# Patient Record
Sex: FEMALE | Race: WHITE | NOT HISPANIC OR LATINO | URBAN - METROPOLITAN AREA
[De-identification: names, ages, dates, MRNs, and addresses within clinical notes are randomized per-mention and may not be internally consistent; named-entity substitution may affect disease eponyms.]

---

## 2019-11-20 ENCOUNTER — EMERGENCY (EMERGENCY)
Facility: HOSPITAL | Age: 84
LOS: 1 days | Discharge: HOME | End: 2019-11-20
Attending: EMERGENCY MEDICINE | Admitting: EMERGENCY MEDICINE
Payer: MEDICARE

## 2019-11-20 VITALS
TEMPERATURE: 98 F | RESPIRATION RATE: 18 BRPM | OXYGEN SATURATION: 100 % | SYSTOLIC BLOOD PRESSURE: 151 MMHG | HEART RATE: 60 BPM | DIASTOLIC BLOOD PRESSURE: 82 MMHG

## 2019-11-20 VITALS
OXYGEN SATURATION: 97 % | RESPIRATION RATE: 14 BRPM | SYSTOLIC BLOOD PRESSURE: 147 MMHG | DIASTOLIC BLOOD PRESSURE: 80 MMHG | WEIGHT: 119.93 LBS | TEMPERATURE: 97 F | HEART RATE: 60 BPM | HEIGHT: 63 IN

## 2019-11-20 LAB
ALBUMIN SERPL ELPH-MCNC: 3.9 G/DL — SIGNIFICANT CHANGE UP (ref 3.3–5)
ALP SERPL-CCNC: 89 U/L — SIGNIFICANT CHANGE UP (ref 40–120)
ALT FLD-CCNC: 19 U/L — SIGNIFICANT CHANGE UP (ref 12–78)
ANION GAP SERPL CALC-SCNC: 7 MMOL/L — SIGNIFICANT CHANGE UP (ref 5–17)
APPEARANCE UR: CLEAR — SIGNIFICANT CHANGE UP
APTT BLD: 34.2 SEC — SIGNIFICANT CHANGE UP (ref 28.5–37)
AST SERPL-CCNC: 23 U/L — SIGNIFICANT CHANGE UP (ref 15–37)
BASOPHILS # BLD AUTO: 0.05 K/UL — SIGNIFICANT CHANGE UP (ref 0–0.2)
BASOPHILS NFR BLD AUTO: 0.4 % — SIGNIFICANT CHANGE UP (ref 0–2)
BILIRUB SERPL-MCNC: 0.4 MG/DL — SIGNIFICANT CHANGE UP (ref 0.2–1.2)
BILIRUB UR-MCNC: NEGATIVE — SIGNIFICANT CHANGE UP
BUN SERPL-MCNC: 26 MG/DL — HIGH (ref 7–23)
CALCIUM SERPL-MCNC: 9.9 MG/DL — SIGNIFICANT CHANGE UP (ref 8.5–10.1)
CHLORIDE SERPL-SCNC: 107 MMOL/L — SIGNIFICANT CHANGE UP (ref 96–108)
CK MB BLD-MCNC: 0.9 % — SIGNIFICANT CHANGE UP (ref 0–3.5)
CK MB CFR SERPL CALC: 2.9 NG/ML — SIGNIFICANT CHANGE UP (ref 0–3.6)
CK SERPL-CCNC: 325 U/L — HIGH (ref 26–192)
CO2 SERPL-SCNC: 28 MMOL/L — SIGNIFICANT CHANGE UP (ref 22–31)
COLOR SPEC: SIGNIFICANT CHANGE UP
CREAT SERPL-MCNC: 0.75 MG/DL — SIGNIFICANT CHANGE UP (ref 0.5–1.3)
DIFF PNL FLD: NEGATIVE — SIGNIFICANT CHANGE UP
EOSINOPHIL # BLD AUTO: 0.09 K/UL — SIGNIFICANT CHANGE UP (ref 0–0.5)
EOSINOPHIL NFR BLD AUTO: 0.8 % — SIGNIFICANT CHANGE UP (ref 0–6)
GLUCOSE SERPL-MCNC: 98 MG/DL — SIGNIFICANT CHANGE UP (ref 70–99)
GLUCOSE UR QL: NEGATIVE — SIGNIFICANT CHANGE UP
HCT VFR BLD CALC: 43.8 % — SIGNIFICANT CHANGE UP (ref 34.5–45)
HGB BLD-MCNC: 14.5 G/DL — SIGNIFICANT CHANGE UP (ref 11.5–15.5)
IMM GRANULOCYTES NFR BLD AUTO: 0.3 % — SIGNIFICANT CHANGE UP (ref 0–1.5)
INR BLD: 1.03 RATIO — SIGNIFICANT CHANGE UP (ref 0.88–1.16)
KETONES UR-MCNC: NEGATIVE — SIGNIFICANT CHANGE UP
LACTATE SERPL-SCNC: 1.1 MMOL/L — SIGNIFICANT CHANGE UP (ref 0.7–2)
LEUKOCYTE ESTERASE UR-ACNC: NEGATIVE — SIGNIFICANT CHANGE UP
LYMPHOCYTES # BLD AUTO: 1.19 K/UL — SIGNIFICANT CHANGE UP (ref 1–3.3)
LYMPHOCYTES # BLD AUTO: 10.1 % — LOW (ref 13–44)
MCHC RBC-ENTMCNC: 29.7 PG — SIGNIFICANT CHANGE UP (ref 27–34)
MCHC RBC-ENTMCNC: 33.1 GM/DL — SIGNIFICANT CHANGE UP (ref 32–36)
MCV RBC AUTO: 89.8 FL — SIGNIFICANT CHANGE UP (ref 80–100)
MONOCYTES # BLD AUTO: 0.68 K/UL — SIGNIFICANT CHANGE UP (ref 0–0.9)
MONOCYTES NFR BLD AUTO: 5.7 % — SIGNIFICANT CHANGE UP (ref 2–14)
NEUTROPHILS # BLD AUTO: 9.79 K/UL — HIGH (ref 1.8–7.4)
NEUTROPHILS NFR BLD AUTO: 82.7 % — HIGH (ref 43–77)
NITRITE UR-MCNC: NEGATIVE — SIGNIFICANT CHANGE UP
NRBC # BLD: 0 /100 WBCS — SIGNIFICANT CHANGE UP (ref 0–0)
NT-PROBNP SERPL-SCNC: 135 PG/ML — SIGNIFICANT CHANGE UP (ref 0–450)
PH UR: 8 — SIGNIFICANT CHANGE UP (ref 5–8)
PLATELET # BLD AUTO: 225 K/UL — SIGNIFICANT CHANGE UP (ref 150–400)
POTASSIUM SERPL-MCNC: 3.4 MMOL/L — LOW (ref 3.5–5.3)
POTASSIUM SERPL-SCNC: 3.4 MMOL/L — LOW (ref 3.5–5.3)
PROT SERPL-MCNC: 7.3 G/DL — SIGNIFICANT CHANGE UP (ref 6–8.3)
PROT UR-MCNC: NEGATIVE — SIGNIFICANT CHANGE UP
PROTHROM AB SERPL-ACNC: 11.7 SEC — SIGNIFICANT CHANGE UP (ref 10–12.9)
RBC # BLD: 4.88 M/UL — SIGNIFICANT CHANGE UP (ref 3.8–5.2)
RBC # FLD: 13.4 % — SIGNIFICANT CHANGE UP (ref 10.3–14.5)
SODIUM SERPL-SCNC: 142 MMOL/L — SIGNIFICANT CHANGE UP (ref 135–145)
SP GR SPEC: 1.01 — SIGNIFICANT CHANGE UP (ref 1.01–1.02)
TROPONIN I SERPL-MCNC: <.015 NG/ML — SIGNIFICANT CHANGE UP (ref 0.01–0.04)
UROBILINOGEN FLD QL: NEGATIVE — SIGNIFICANT CHANGE UP
WBC # BLD: 11.84 K/UL — HIGH (ref 3.8–10.5)
WBC # FLD AUTO: 11.84 K/UL — HIGH (ref 3.8–10.5)

## 2019-11-20 PROCEDURE — 85730 THROMBOPLASTIN TIME PARTIAL: CPT

## 2019-11-20 PROCEDURE — 83880 ASSAY OF NATRIURETIC PEPTIDE: CPT

## 2019-11-20 PROCEDURE — 71045 X-RAY EXAM CHEST 1 VIEW: CPT | Mod: 26

## 2019-11-20 PROCEDURE — 82553 CREATINE MB FRACTION: CPT

## 2019-11-20 PROCEDURE — 82550 ASSAY OF CK (CPK): CPT

## 2019-11-20 PROCEDURE — 93005 ELECTROCARDIOGRAM TRACING: CPT

## 2019-11-20 PROCEDURE — 70450 CT HEAD/BRAIN W/O DYE: CPT

## 2019-11-20 PROCEDURE — 85610 PROTHROMBIN TIME: CPT

## 2019-11-20 PROCEDURE — 72100 X-RAY EXAM L-S SPINE 2/3 VWS: CPT

## 2019-11-20 PROCEDURE — 71045 X-RAY EXAM CHEST 1 VIEW: CPT

## 2019-11-20 PROCEDURE — 81003 URINALYSIS AUTO W/O SCOPE: CPT

## 2019-11-20 PROCEDURE — 99284 EMERGENCY DEPT VISIT MOD MDM: CPT | Mod: 25

## 2019-11-20 PROCEDURE — 72170 X-RAY EXAM OF PELVIS: CPT

## 2019-11-20 PROCEDURE — 99285 EMERGENCY DEPT VISIT HI MDM: CPT

## 2019-11-20 PROCEDURE — 72125 CT NECK SPINE W/O DYE: CPT | Mod: 26

## 2019-11-20 PROCEDURE — 70450 CT HEAD/BRAIN W/O DYE: CPT | Mod: 26

## 2019-11-20 PROCEDURE — 72170 X-RAY EXAM OF PELVIS: CPT | Mod: 26

## 2019-11-20 PROCEDURE — 36415 COLL VENOUS BLD VENIPUNCTURE: CPT

## 2019-11-20 PROCEDURE — 84484 ASSAY OF TROPONIN QUANT: CPT

## 2019-11-20 PROCEDURE — 83605 ASSAY OF LACTIC ACID: CPT

## 2019-11-20 PROCEDURE — 72100 X-RAY EXAM L-S SPINE 2/3 VWS: CPT | Mod: 26

## 2019-11-20 PROCEDURE — 93010 ELECTROCARDIOGRAM REPORT: CPT

## 2019-11-20 PROCEDURE — 85027 COMPLETE CBC AUTOMATED: CPT

## 2019-11-20 PROCEDURE — 80053 COMPREHEN METABOLIC PANEL: CPT

## 2019-11-20 PROCEDURE — 97162 PT EVAL MOD COMPLEX 30 MIN: CPT

## 2019-11-20 PROCEDURE — 72125 CT NECK SPINE W/O DYE: CPT

## 2019-11-20 RX ORDER — ACETAMINOPHEN 500 MG
650 TABLET ORAL ONCE
Refills: 0 | Status: COMPLETED | OUTPATIENT
Start: 2019-11-20 | End: 2019-11-20

## 2019-11-20 RX ADMIN — Medication 650 MILLIGRAM(S): at 12:40

## 2019-11-20 RX ADMIN — Medication 650 MILLIGRAM(S): at 11:40

## 2019-11-20 NOTE — ED ADULT NURSE NOTE - OBJECTIVE STATEMENT
86 y/o female comes in A&Ox4 from home via EMS with complaints of fall out of bed. States she got up to go to the bathroom and when she came back to the bedroom she fell. Denies LOC or hitting her head. States she was unable to get up and is complaining of generalized pain. EKG obtained. PIV started. Plan of care discussed with patient. Comfort and safety maintained. Will continue to monitor.

## 2019-11-20 NOTE — ED PROVIDER NOTE - PHYSICAL EXAMINATION
GEN: awake, alert, well appearing, NAD   HENT: atraumatic, normocephalic, SHADY, EOMI, no midline instability, oropharynx w/o erythema or exudates, no lymphadenopathy  CV: normal rate and rhythm, S1, S2, no MRG, equal pulses throughout, no JVD  RESP: no distress, no IWOB, no retraction, clear to auscultation bilaterally   ABD: soft, nontender, nondistended, no rebound, no guarding, normoactive bowel sounds, no organomegally  MUSCULOSKELETAL: strenght 5/5 x 4, full range of motion, CMS intact   SKIN: normal color, no turgor, no wounds or rash   NEURO: Awake alert oriented x 3, no facial asymmetry, no slurred speech, no pronator drift, moving all extremities  PSYCH: no suicial ideation, no homicidal ideation, no depression, no anxiety, no hallucination

## 2019-11-20 NOTE — ED PROVIDER NOTE - CARE PLAN
Assessment and plan of treatment:	88yo F h/o dementia, htn p/w fall last night unable to get up, c/o diffuse body aches. unknown head injury, not on AC. Principal Discharge DX:	Fall, initial encounter  Assessment and plan of treatment:	88yo F h/o dementia, htn p/w fall last night unable to get up, c/o diffuse body aches. unknown head injury, not on AC.  Secondary Diagnosis:	Unsteady gait

## 2019-11-20 NOTE — PHYSICAL THERAPY INITIAL EVALUATION ADULT - PERTINENT HX OF CURRENT PROBLEM, REHAB EVAL
Pt fell at home this morning and ws unable to stand up or walk. CT of head and neck  and x-rays of hip and spine were (-) for acute pathology. Pt's history is significant for dementia

## 2019-11-20 NOTE — ED PROVIDER NOTE - PLAN OF CARE
86yo F h/o dementia, htn p/w fall last night unable to get up, c/o diffuse body aches. unknown head injury, not on AC.

## 2019-11-20 NOTE — ED PROVIDER NOTE - CLINICAL SUMMARY MEDICAL DECISION MAKING FREE TEXT BOX
86yo F h/o dementia, htn p/w fall last night unable to get up, c/o diffuse body aches. unknown head injury, not on AC. 86yo F h/o dementia, htn p/w fall last night unable to get up, c/o diffuse body aches. unknown head injury, not on AC.  labs ct xr unremarkable,   pt seen by PT, rec'd rehab. pt and  declines rehab, declines to speak to SW for home PT/aid, requesting to be discharged, will follow up with pcp and will have pcp arrange for services/rehab if they feel they need it. discussed that I feel this is unsafe discharge however patient and  adamant in their refusal in rehab/home services, both patient and  competent to make medical decisions, understands risk of discharge.

## 2019-11-20 NOTE — PHYSICAL THERAPY INITIAL EVALUATION ADULT - ADDITIONAL COMMENTS
Pt owns a rollator which she uses . Pt has no steps to enter home and 5 steps to bedroom with 2 handrails.

## 2019-11-20 NOTE — ED PROVIDER NOTE - PATIENT PORTAL LINK FT
You can access the FollowMyHealth Patient Portal offered by [facility] by registering at the following website: [email or web address]. By joining [employer]’s FollowMyHealth portal, you will also be able to view your health information using other applications (apps) compatible with our system.

## 2019-11-20 NOTE — ED PROVIDER NOTE - OBJECTIVE STATEMENT
88yo F h/o dementia, htn p/w fall last night unable to get up, c/o diffuse body aches. unknown head injury, not on AC.

## 2019-11-20 NOTE — ED ADULT NURSE NOTE - CHPI ED NUR SYMPTOMS NEG
no numbness/no tingling/no fever/no loss of consciousness/no confusion/no bleeding/no abrasion/no vomiting/no deformity

## 2020-10-01 ENCOUNTER — EMERGENCY (EMERGENCY)
Facility: HOSPITAL | Age: 85
LOS: 1 days | Discharge: HOME | End: 2020-10-01
Attending: EMERGENCY MEDICINE | Admitting: EMERGENCY MEDICINE
Payer: MEDICARE

## 2020-10-01 VITALS
TEMPERATURE: 98 F | WEIGHT: 134.92 LBS | OXYGEN SATURATION: 100 % | HEART RATE: 76 BPM | RESPIRATION RATE: 18 BRPM | SYSTOLIC BLOOD PRESSURE: 110 MMHG | HEIGHT: 63 IN | DIASTOLIC BLOOD PRESSURE: 57 MMHG

## 2020-10-01 PROCEDURE — 99283 EMERGENCY DEPT VISIT LOW MDM: CPT

## 2020-10-01 PROCEDURE — 73060 X-RAY EXAM OF HUMERUS: CPT | Mod: 26,LT

## 2020-10-01 PROCEDURE — 72170 X-RAY EXAM OF PELVIS: CPT | Mod: 26

## 2020-10-01 PROCEDURE — 73590 X-RAY EXAM OF LOWER LEG: CPT | Mod: 26,LT

## 2020-10-01 RX ORDER — TETANUS TOXOID, REDUCED DIPHTHERIA TOXOID AND ACELLULAR PERTUSSIS VACCINE, ADSORBED 5; 2.5; 8; 8; 2.5 [IU]/.5ML; [IU]/.5ML; UG/.5ML; UG/.5ML; UG/.5ML
0.5 SUSPENSION INTRAMUSCULAR ONCE
Refills: 0 | Status: COMPLETED | OUTPATIENT
Start: 2020-10-01 | End: 2020-10-01

## 2020-10-01 RX ADMIN — TETANUS TOXOID, REDUCED DIPHTHERIA TOXOID AND ACELLULAR PERTUSSIS VACCINE, ADSORBED 0.5 MILLILITER(S): 5; 2.5; 8; 8; 2.5 SUSPENSION INTRAMUSCULAR at 22:43

## 2020-10-01 NOTE — ED ADULT NURSE NOTE - OBJECTIVE STATEMENT
Pt Presents to ED s/p fall. Pt states she was in the kitchen and slipped hitting her head on the tile floor. Pt denies taking blood thinners. Pt has neck pain. wound on nose. Pt is able to move x4 extremities. no deformities noted.

## 2020-10-01 NOTE — ED PROVIDER NOTE - NSFOLLOWUPINSTRUCTIONS_ED_ALL_ED_FT
1) Follow-up with your Primary Medical Doctor and Dr. Joseph. Call today / next business day for prompt follow-up.  2) Return to Emergency room for any worsening or persistent pain, weakness, fever, or any other concerning symptoms.  3) See attached instruction sheets for additional information, including information regarding signs and symptoms to look out for, reasons to seek immediate care and other important instructions.  4) Augmentin 875mg twice a day for 10 days.

## 2020-10-01 NOTE — ED PROVIDER NOTE - OBJECTIVE STATEMENT
87 yo female hx of dementia, htn, hypothyroid s/p mechanical slip and fall at home, fell onto her face/nose, no LOC, no neck pain c/o nasal pain, left upper arm pain and left lower leg pain BIBEMS for evaluation. Mild, nonradiating, no medications taken.  Denies any anticoagulant use. tdap not up to date

## 2020-10-01 NOTE — ED ADULT NURSE NOTE - NSIMPLEMENTINTERV_GEN_ALL_ED
Implemented All Fall with Harm Risk Interventions:  [place] to call system. Call bell, personal items and telephone within reach. Instruct patient to call for assistance. Room bathroom lighting operational. Non-slip footwear when patient is off stretcher. Physically safe environment: no spills, clutter or unnecessary equipment. Stretcher in lowest position, wheels locked, appropriate side rails in place. Provide visual cue, wrist band, yellow gown, etc. Monitor gait and stability. Monitor for mental status changes and reorient to person, place, and time. Review medications for side effects contributing to fall risk. Reinforce activity limits and safety measures with patient and family. Provide visual clues: red socks.

## 2020-10-01 NOTE — ED PROVIDER NOTE - NS ED ROS FT
Constitutional: - Fever, - Chills, - Anorexia, - Fatigue, - Night sweats  Eyes: - Discharge, - Irritation, - Redness, - Visual changes, - Light sensitivity, - Pain  EARS: - Ear Pain, - Tinnitus, - Decreased hearing  NOSE: - Congestion, - Bloody nose  MOUTH/THROAT: - Vocal Changes, - Drooling, - Sore throat  NECK: - Lumps, - Stiffness, - Pain  CV: - Palpitations, - Chest Pain, - Edema, - Syncope  RESP:  - Cough, - Shortness of Breath, - Dyspnea on Exertion, - Trouble speaking, - Pleuritic pain - Wheezing  GI: - Diarrhea, - Constipation, - Bloody stools, - Nausea, - Vomiting, - Abdominal Pain  : - Dysuria, -Frequency, - Hematuria, - Hesitancy, - Incontinence, - Saddle Anesthesia, - Abnormal discharge  MSK: - Myalgias, - Arthralgias, - Weakness, - Deformities, +arm pain  SKIN: - Color change, - Rash, - Swelling, - Ecchymosis, - Abrasion, +laceration  NEURO: - Change in behavior, - Dec. Alertness, - Headache, - Dizziness, - Change in speech, - Weakness, - Seizure-like activity, - Difficulty ambulating

## 2020-10-01 NOTE — ED PROVIDER NOTE - PHYSICAL EXAMINATION
Gen: Alert, NAD  Head/eyes: NC/AT, PERRL, EOM  ENT: airway patent, +dried blood b/l nares, +u-shaped nasal bridge laceration 1.5cm  Neck: supple, no tenderness/meningismus/JVD, Trachea midline  Pulm/lung: Bilateral clear BS, normal resp effort, no wheeze/stridor/retractions  CV/heart: RRR, no M/R/G, +2 dist pulses (radial, pedal DP/PT, popliteal)  GI/Abd: soft, NT/ND, +BS, no guarding/rebound tenderness  Musculoskeletal: no edema/erythema/cyanosis, FROM in all extremities, no C/T/L spine ttp, +ttp left upper arm, left distal tib/fib +ttp  Skin: no rash, no vesicles, no petechaie, no ecchymosis, no swelling  Neuro: AAOx3, CN 2-12 intact, normal sensation, 5/5 motor strength in all extremities Gen: Alert, NAD  Head/eyes: NC/AT, PERRL, EOM  ENT: airway patent, +dried blood b/l nares, +u-shaped nasal bridge laceration 1.5cm, no septal hematoma  Neck: supple, no tenderness/meningismus/JVD, Trachea midline  Pulm/lung: Bilateral clear BS, normal resp effort, no wheeze/stridor/retractions  CV/heart: RRR, no M/R/G, +2 dist pulses (radial, pedal DP/PT, popliteal)  GI/Abd: soft, NT/ND, +BS, no guarding/rebound tenderness  Musculoskeletal: no edema/erythema/cyanosis, FROM in all extremities, no C/T/L spine ttp, +ttp left upper arm, left distal tib/fib +ttp  Skin: no rash, no vesicles, no petechaie, no ecchymosis, no swelling  Neuro: AAOx3, CN 2-12 intact, normal sensation, 5/5 motor strength in all extremities

## 2020-10-01 NOTE — ED ADULT TRIAGE NOTE - CHIEF COMPLAINT QUOTE
BIBEMS from home S/P fall, slip while getting out of chair, unknown LOC, hit her face on the floor. C/O pain to the nasal bone, +swelling to the left forearm.

## 2020-10-01 NOTE — ED PROVIDER NOTE - PATIENT PORTAL LINK FT
You can access the FollowMyHealth Patient Portal offered by [facility] by registering at the following website: [email or web address]. By joining [employer]’s FollowMyHealth portal, you will also be able to view your health information using other applications (apps) compatible with our system.

## 2020-10-01 NOTE — ED PROVIDER NOTE - CARE PROVIDER_API CALL
Yoni Joseph  [facility]  [address] [place] [address]  Phone: [phone]  Fax: [phone]  Follow Up Time:

## 2020-10-01 NOTE — ED PROVIDER NOTE - CARE PLAN
Principal Discharge DX:	Open fracture of nasal bone, initial encounter  Secondary Diagnosis:	Nasal laceration, initial encounter  Secondary Diagnosis:	Fall, initial encounter

## 2020-10-02 VITALS
HEART RATE: 61 BPM | OXYGEN SATURATION: 95 % | SYSTOLIC BLOOD PRESSURE: 149 MMHG | RESPIRATION RATE: 18 BRPM | DIASTOLIC BLOOD PRESSURE: 74 MMHG | TEMPERATURE: 98 F

## 2020-10-02 PROBLEM — F03.90 UNSPECIFIED DEMENTIA WITHOUT BEHAVIORAL DISTURBANCE: Chronic | Status: ACTIVE | Noted: 2019-11-20

## 2020-10-02 PROBLEM — E03.9 HYPOTHYROIDISM, UNSPECIFIED: Chronic | Status: ACTIVE | Noted: 2019-11-20

## 2020-10-02 PROBLEM — I10 ESSENTIAL (PRIMARY) HYPERTENSION: Chronic | Status: ACTIVE | Noted: 2019-11-20

## 2020-10-02 PROCEDURE — 70450 CT HEAD/BRAIN W/O DYE: CPT

## 2020-10-02 PROCEDURE — 70486 CT MAXILLOFACIAL W/O DYE: CPT | Mod: 26

## 2020-10-02 PROCEDURE — 12011 RPR F/E/E/N/L/M 2.5 CM/<: CPT

## 2020-10-02 PROCEDURE — 73590 X-RAY EXAM OF LOWER LEG: CPT

## 2020-10-02 PROCEDURE — 70486 CT MAXILLOFACIAL W/O DYE: CPT

## 2020-10-02 PROCEDURE — 90471 IMMUNIZATION ADMIN: CPT

## 2020-10-02 PROCEDURE — 72170 X-RAY EXAM OF PELVIS: CPT

## 2020-10-02 PROCEDURE — 73060 X-RAY EXAM OF HUMERUS: CPT

## 2020-10-02 PROCEDURE — 70450 CT HEAD/BRAIN W/O DYE: CPT | Mod: 26

## 2020-10-02 PROCEDURE — 99284 EMERGENCY DEPT VISIT MOD MDM: CPT | Mod: 25

## 2020-10-02 PROCEDURE — 90715 TDAP VACCINE 7 YRS/> IM: CPT

## 2020-10-02 RX ADMIN — Medication 1 TABLET(S): at 02:18

## 2021-06-12 ENCOUNTER — INPATIENT (INPATIENT)
Facility: HOSPITAL | Age: 86
LOS: 5 days | Discharge: NURSING, REHAB OR LONG-TERM CARE | DRG: 884 | End: 2021-06-18
Attending: FAMILY MEDICINE | Admitting: FAMILY MEDICINE
Payer: MEDICARE

## 2021-06-12 VITALS
HEIGHT: 63 IN | TEMPERATURE: 96 F | RESPIRATION RATE: 18 BRPM | DIASTOLIC BLOOD PRESSURE: 74 MMHG | OXYGEN SATURATION: 94 % | SYSTOLIC BLOOD PRESSURE: 129 MMHG | WEIGHT: 115.08 LBS | HEART RATE: 65 BPM

## 2021-06-12 DIAGNOSIS — E03.9 HYPOTHYROIDISM, UNSPECIFIED: ICD-10-CM

## 2021-06-12 DIAGNOSIS — E78.5 HYPERLIPIDEMIA, UNSPECIFIED: ICD-10-CM

## 2021-06-12 DIAGNOSIS — Z98.890 OTHER SPECIFIED POSTPROCEDURAL STATES: Chronic | ICD-10-CM

## 2021-06-12 DIAGNOSIS — I10 ESSENTIAL (PRIMARY) HYPERTENSION: ICD-10-CM

## 2021-06-12 DIAGNOSIS — Z90.710 ACQUIRED ABSENCE OF BOTH CERVIX AND UTERUS: Chronic | ICD-10-CM

## 2021-06-12 DIAGNOSIS — K21.9 GASTRO-ESOPHAGEAL REFLUX DISEASE WITHOUT ESOPHAGITIS: ICD-10-CM

## 2021-06-12 DIAGNOSIS — F03.90 UNSPECIFIED DEMENTIA, UNSPECIFIED SEVERITY, WITHOUT BEHAVIORAL DISTURBANCE, PSYCHOTIC DISTURBANCE, MOOD DISTURBANCE, AND ANXIETY: ICD-10-CM

## 2021-06-12 DIAGNOSIS — R13.10 DYSPHAGIA, UNSPECIFIED: ICD-10-CM

## 2021-06-12 DIAGNOSIS — F03.91 UNSPECIFIED DEMENTIA WITH BEHAVIORAL DISTURBANCE: ICD-10-CM

## 2021-06-12 LAB
ALBUMIN SERPL ELPH-MCNC: 3.3 G/DL — SIGNIFICANT CHANGE UP (ref 3.3–5)
ALP SERPL-CCNC: 86 U/L — SIGNIFICANT CHANGE UP (ref 40–120)
ALT FLD-CCNC: 14 U/L — SIGNIFICANT CHANGE UP (ref 12–78)
ANION GAP SERPL CALC-SCNC: 5 MMOL/L — SIGNIFICANT CHANGE UP (ref 5–17)
AST SERPL-CCNC: 22 U/L — SIGNIFICANT CHANGE UP (ref 15–37)
BILIRUB SERPL-MCNC: 0.4 MG/DL — SIGNIFICANT CHANGE UP (ref 0.2–1.2)
BUN SERPL-MCNC: 21 MG/DL — SIGNIFICANT CHANGE UP (ref 7–23)
CALCIUM SERPL-MCNC: 9.6 MG/DL — SIGNIFICANT CHANGE UP (ref 8.5–10.1)
CHLORIDE SERPL-SCNC: 107 MMOL/L — SIGNIFICANT CHANGE UP (ref 96–108)
CO2 SERPL-SCNC: 31 MMOL/L — SIGNIFICANT CHANGE UP (ref 22–31)
CREAT SERPL-MCNC: 0.82 MG/DL — SIGNIFICANT CHANGE UP (ref 0.5–1.3)
GLUCOSE SERPL-MCNC: 95 MG/DL — SIGNIFICANT CHANGE UP (ref 70–99)
HCT VFR BLD CALC: 42.9 % — SIGNIFICANT CHANGE UP (ref 34.5–45)
HGB BLD-MCNC: 13.8 G/DL — SIGNIFICANT CHANGE UP (ref 11.5–15.5)
MAGNESIUM SERPL-MCNC: 2 MG/DL — SIGNIFICANT CHANGE UP (ref 1.6–2.6)
MCHC RBC-ENTMCNC: 28 PG — SIGNIFICANT CHANGE UP (ref 27–34)
MCHC RBC-ENTMCNC: 32.2 GM/DL — SIGNIFICANT CHANGE UP (ref 32–36)
MCV RBC AUTO: 87 FL — SIGNIFICANT CHANGE UP (ref 80–100)
NRBC # BLD: 0 /100 WBCS — SIGNIFICANT CHANGE UP (ref 0–0)
PLATELET # BLD AUTO: 236 K/UL — SIGNIFICANT CHANGE UP (ref 150–400)
POTASSIUM SERPL-MCNC: 3.4 MMOL/L — LOW (ref 3.5–5.3)
POTASSIUM SERPL-SCNC: 3.4 MMOL/L — LOW (ref 3.5–5.3)
PROT SERPL-MCNC: 7 G/DL — SIGNIFICANT CHANGE UP (ref 6–8.3)
RBC # BLD: 4.93 M/UL — SIGNIFICANT CHANGE UP (ref 3.8–5.2)
RBC # FLD: 13.9 % — SIGNIFICANT CHANGE UP (ref 10.3–14.5)
SARS-COV-2 RNA SPEC QL NAA+PROBE: SIGNIFICANT CHANGE UP
SODIUM SERPL-SCNC: 143 MMOL/L — SIGNIFICANT CHANGE UP (ref 135–145)
T4 AB SER-ACNC: 8.9 UG/DL — SIGNIFICANT CHANGE UP (ref 4.6–12)
T4/T3 UPTAKE INDEX SERPL: 0.9 TBI — SIGNIFICANT CHANGE UP (ref 0.8–1.3)
TSH SERPL-MCNC: 0.03 UIU/ML — LOW (ref 0.36–3.74)
WBC # BLD: 5.85 K/UL — SIGNIFICANT CHANGE UP (ref 3.8–10.5)
WBC # FLD AUTO: 5.85 K/UL — SIGNIFICANT CHANGE UP (ref 3.8–10.5)

## 2021-06-12 PROCEDURE — 93010 ELECTROCARDIOGRAM REPORT: CPT

## 2021-06-12 PROCEDURE — 99285 EMERGENCY DEPT VISIT HI MDM: CPT

## 2021-06-12 PROCEDURE — 99223 1ST HOSP IP/OBS HIGH 75: CPT | Mod: GC,AI

## 2021-06-12 PROCEDURE — 70450 CT HEAD/BRAIN W/O DYE: CPT | Mod: 26,MA

## 2021-06-12 RX ORDER — HYDROCHLOROTHIAZIDE 25 MG
25 TABLET ORAL DAILY
Refills: 0 | Status: DISCONTINUED | OUTPATIENT
Start: 2021-06-12 | End: 2021-06-18

## 2021-06-12 RX ORDER — POTASSIUM GLUCONATE 2.5 MEQ
1 TABLET ORAL
Qty: 0 | Refills: 0 | DISCHARGE

## 2021-06-12 RX ORDER — AMLODIPINE BESYLATE 2.5 MG/1
1 TABLET ORAL
Qty: 0 | Refills: 0 | DISCHARGE

## 2021-06-12 RX ORDER — AMLODIPINE BESYLATE 2.5 MG/1
2.5 TABLET ORAL DAILY
Refills: 0 | Status: DISCONTINUED | OUTPATIENT
Start: 2021-06-12 | End: 2021-06-18

## 2021-06-12 RX ADMIN — Medication 40 MILLIGRAM(S): at 22:35

## 2021-06-12 NOTE — H&P ADULT - NSHPSOURCEINFOTX_GEN_ALL_CORE
Moderna 2/2, last dose 4/15/21 Moderna 2/2, last dose 4/15/21;   is HCP with daughter Rosa Elena [phone]

## 2021-06-12 NOTE — ED PROVIDER NOTE - CONSTITUTIONAL, MLM
normal... Well appearing, elderly but confused white female, awake, alert, oriented to person, in no apparent distress.

## 2021-06-12 NOTE — ED ADULT NURSE REASSESSMENT NOTE - SYMPTOMS
----- Message from Angelika Light sent at 4/20/2018 11:12 AM CDT -----  Contact: Patient [phone]  Patient needs her orders for her MRI on her ankle to be faxed over to [facility] [phone]. Patient is waiting at the imaging office.     Please call and advise.    Thank You   Alert and oriented to self

## 2021-06-12 NOTE — H&P ADULT - NSICDXPASTSURGICALHX_GEN_ALL_CORE_FT
PAST SURGICAL HISTORY:  No significant past surgical history      PAST SURGICAL HISTORY:  H/O inguinal hernia repair BILATERAL    S/P hysterectomy

## 2021-06-12 NOTE — H&P ADULT - NSHPPHYSICALEXAM_GEN_ALL_CORE
GENERAL: patient appears well, no acute distress, confused  EYES: anicteric sclerae, no exudates  ENMT: oropharynx clear without erythema, no exudates, moist mucous membranes  NECK: supple, soft, no thyromegaly noted  LUNGS: clear to auscultation, no intercostal retractions  HEART: S1/S2, regular rate and rhythm, no murmurs noted, no lower extremity edema  GASTROINTESTINAL: abdomen is soft, nontender, nondistended, normoactive bowel sounds, no palpable masses  INTEGUMENT: good skin turgor, warm skin, appears well perfused  MUSCULOSKELETAL: no clubbing or cyanosis, no obvious deformity  NEUROLOGIC: awake, alert, oriented x1, good muscle tone in 4 extremities, no obvious sensory deficits. Confused, mild apraxia, aphasia   PSYCHIATRIC: mood is good, affect is congruent, linear and logical thought process  HEME/LYMPH: no palpable supraclavicular nodules, no obvious ecchymosis or petechiae GENERAL: no acute distress, confused, elderly, frail, malnourished  EYES: anicteric sclerae, no exudates  ENMT: oropharynx clear without erythema, no exudates, moist mucous membranes  NECK: supple, soft, no thyromegaly noted  LUNGS: clear to auscultation, no intercostal retractions  HEART: S1/S2, regular rate and rhythm, no murmurs noted, no lower extremity edema  GASTROINTESTINAL: abdomen is soft, nontender, nondistended, normoactive bowel sounds, no palpable masses  INTEGUMENT: good skin turgor, warm skin, appears well perfused  MUSCULOSKELETAL: no clubbing or cyanosis, no obvious deformity  NEUROLOGIC: awake, alert, oriented x1, good muscle tone in 4 extremities RE > LE, no obvious sensory deficits. Confused, mild apraxia, aphasia   PSYCHIATRIC: mood is good, affect is congruent, linear and logical thought process  HEME/LYMPH: no palpable supraclavicular nodules, no obvious ecchymosis or petechiae

## 2021-06-12 NOTE — H&P ADULT - PROBLEM SELECTOR PLAN 1
No official dx given by PMD however patient showing stereotypical signs for >2years. Patient deconditioned, not able to perform any ADLS. Family unable to care for pt at home.   - CT head: Status post right frontal temporal craniotomy for surgical clipping in the right parasellar region. Metallic artifact limits evaluation of the surrounding parenchyma. Encephalomalacia on the right frontal lobe underlying the craniotomy is reidentified. Findings are unchanged since prior exam. The ventricles and sulci are prominent, compatible with age-related generalized cerebral volume loss. There is no CT evidence for acute cerebral cortical infarct. There is no evidence of hemorrhage. Small chronic lacunar infarcts in the left basal ganglia are present. There is periventricular and subcortical white matter hypoattenuation,  most compatible with chronic microvascular ischemic changes.  No mass effect is found in the brain. There is no midline shift or herniation pattern.  - Continue home buspar 5mg qd   - Neurology consulted -   - PT & SW consulted  - palliative Dr Garcia consulted for [facility] No official dx given by PMD however patient showing stereotypical signs for >2years. Patient deconditioned, not able to perform any ADLS. Family unable to care for pt at home.   - CT head: Status post right frontal temporal craniotomy for surgical clipping in the right parasellar region. Metallic artifact limits evaluation of the surrounding parenchyma. Encephalomalacia on the right frontal lobe underlying the craniotomy is reidentified. Findings are unchanged since prior exam. The ventricles and sulci are prominent, compatible with age-related generalized cerebral volume loss. There is no CT evidence for acute cerebral cortical infarct. There is no evidence of hemorrhage. Small chronic lacunar infarcts in the left basal ganglia are present. There is periventricular and subcortical white matter hypoattenuation,  most compatible with chronic microvascular ischemic changes.  No mass effect is found in the brain. There is no midline shift or herniation pattern.  - Pt will likely require long term nursing facility  - Continue home buspar 5mg qd   - Neurology consulted Dr Kraus, f/u recs  - PT & SW consulted  - Palliative Dr Garcia consulted for [facility] No official dx given by PMD however patient showing stereotypical signs for >2years. Patient deconditioned, not able to perform any ADLS. Family unable to care for pt at home. Family requesting long term facility.  - CT head: Status post right frontal temporal craniotomy for surgical clipping in the right parasellar region. Metallic artifact limits evaluation of the surrounding parenchyma. Encephalomalacia on the right frontal lobe underlying the craniotomy is reidentified. Findings are unchanged since prior exam. The ventricles and sulci are prominent, compatible with age-related generalized cerebral volume loss. There is no CT evidence for acute cerebral cortical infarct. There is no evidence of hemorrhage. Small chronic lacunar infarcts in the left basal ganglia are present. There is periventricular and subcortical white matter hypoattenuation,  most compatible with chronic microvascular ischemic changes.  No mass effect is found in the brain. There is no midline shift or herniation pattern.  - Pt will likely require long term nursing facility  - Enhanced supervision for now, consider 1:1 obs if patient becomes agitated or is flight risk   - Fall and aspiration precautions  - Continue home buspar 5mg qd   - Neurology consulted Dr Kraus, f/u recs  - PT & SW consulted  - Palliative Dr Garcia consulted for [facility] No official dx given by PMD however patient showing stereotypical signs for >2years. Patient deconditioned, not able to perform any ADLS. Family unable to care for pt at home. Family requesting long term facility.  - CT head: Status post right frontal temporal craniotomy for surgical clipping in the right parasellar region. Metallic artifact limits evaluation of the surrounding parenchyma. Encephalomalacia on the right frontal lobe underlying the craniotomy is reidentified. Findings are unchanged since prior exam. The ventricles and sulci are prominent, compatible with age-related generalized cerebral volume loss. There is no CT evidence for acute cerebral cortical infarct. There is no evidence of hemorrhage. Small chronic lacunar infarcts in the left basal ganglia are present. There is periventricular and subcortical white matter hypoattenuation,  most compatible with chronic microvascular ischemic changes.  No mass effect is found in the brain. There is no midline shift or herniation pattern.  - Pt will likely require long term nursing facility  - Enhanced supervision for now, consider 1:1 obs if patient becomes agitated or is flight risk   - Fall and aspiration precautions  - Continue home buspar 5mg qd   - Neurology consulted Dr Kraus, f/u recs  - PT, nutrition & SW consulted  - Palliative Dr Garcia consulted for [facility] No official dx given by PMD however patient showing stereotypical signs for >2years. Patient deconditioned, not able to perform any ADLS. Family unable to care for pt at home. Family requesting long term facility.  - CT head: Status post right frontal temporal craniotomy for surgical clipping in the right parasellar region. Metallic artifact limits evaluation of the surrounding parenchyma. Encephalomalacia on the right frontal lobe underlying the craniotomy is reidentified. Findings are unchanged since prior exam. The ventricles and sulci are prominent, compatible with age-related generalized cerebral volume loss. There is no CT evidence for acute cerebral cortical infarct. There is no evidence of hemorrhage. Small chronic lacunar infarcts in the left basal ganglia are present. There is periventricular and subcortical white matter hypoattenuation,  most compatible with chronic microvascular ischemic changes.  No mass effect is found in the brain. There is no midline shift or herniation pattern.  - Pt will likely require long term nursing facility  - Enhanced supervision for now, consider 1:1 obs if patient becomes agitated or is flight risk   - Fall and aspiration precautions  - Continue home buspar 5mg qd   - Neurology consulted Dr Kraus, f/u recs  - PT, nutrition & SW consulted  - Palliative Dr Garcia consulted for [facility], molst completion and advance care planning

## 2021-06-12 NOTE — PATIENT PROFILE ADULT - NSPROGENSOURCEINFO_GEN_A_NUR
patient Jose E-daughter called/family Debroah- Called Daughter to obtain admission assessment information/family

## 2021-06-12 NOTE — H&P ADULT - ASSESSMENT
88 yo F PMHx Dementia, HTN , hypothyroidism, hyperthyroidism, brain aneurysm (1979), presenting to ER by family for gradual worsening of confusion and inability to perform ADL. Patient admitted for placement.     ED Course: No treatments in ER  Vitals T(F): 97.8, HR: 69, BP: 148/78, RR: 18, SpO2: 94% on room air   Labs significant for TSH 0.3   Imaging: CT head : Status post right frontal temporal craniotomy for surgical clipping in the right parasellar region. Metallic artifact limits evaluation of the surrounding parenchyma. Encephalomalacia on the right frontal lobe underlying the craniotomy is reidentified. Findings are unchanged since prior exam. The ventricles and sulci are prominent, compatible with age-related generalized cerebral volume loss.   There is no CT evidence for acute cerebral cortical infarct. There is no evidence of hemorrhage. Small chronic lacunar infarcts in the left basal ganglia are present. There is periventricular and subcortical white matter hypoattenuation,  most compatible with chronic microvascular ischemic changes.   No mass effect is found in the brain.  There is no midline shift or herniation pattern.     89 yo female with PMHx of dementia, HTN, HLD, hypothyroidism, hyperthyroidism, brain aneurysm (1979), presenting to ER by family for gradual worsening of confusion and inability to perform ADL. Patient admitted for placement and neurology eval.

## 2021-06-12 NOTE — ED ADULT NURSE NOTE - OBJECTIVE STATEMENT
Patient is 88yo female presenting to [facility] with alter mental status patient family state that she has declined greatly and can not do anything for herself Patient is incontinent. Patient has difficulty swallowing and several falls over last couple of months. Patient family states that she has no strength and has trouble communicating verbally with forming words and mood swings and sundowning

## 2021-06-12 NOTE — ED PROVIDER NOTE - PROVIDER TOKENS
Patient is called with urine culture results per Dr. Roa and told that script needs to be changed to Keflex BID for 7 days. Patient would like to use [facility]/ [address]. Script will be sent there per .   PROVIDER:[TOKEN:[[id number]]]

## 2021-06-12 NOTE — ED PROVIDER NOTE - NSFOLLOWUPINSTRUCTIONS_ED_ALL_ED_FT
Rest  Do not take your Thyroid medication for two days at which time follow-up with your primary doctor who will make appropriate alteration in the dose of Thyroid Hormone.   Return here if needed

## 2021-06-12 NOTE — H&P ADULT - PROBLEM SELECTOR PLAN 4
Chronic, stable  - Continue home pravastatin 40 mg po qd Chronic, stable  - Continue home pravastatin 40 mg po qd    DVT PPX: SCDs given pt is a fall risk with hx of falls Chronic,  on admission  - Continue home BP meds amlodipine 2.5mg and HCTZ 25mg po qd with hold parameters  - Monitor routine hemodynamics

## 2021-06-12 NOTE — ED PROVIDER NOTE - CARE PROVIDER_API CALL
DARLENE BOSS  [id number]  [address] [place] [address]  Phone: [phone]  Fax: [phone]  Follow Up Time:

## 2021-06-12 NOTE — H&P ADULT - PROBLEM SELECTOR PLAN 5
Chronic, stable  - Continue home pravastatin 40 mg po qd    DVT PPX: SCDs given pt is a fall risk with hx of falls

## 2021-06-12 NOTE — H&P ADULT - HISTORY OF PRESENT ILLNESS
88 yo F PMHx Dementia, HTN , hypothyroidism presenting to ER by family for gradual worsening of confusion and inability to perform ADL. This has been worsening x months. No acute deterioration. No recent illnesses per  and Daughter who are at bedside. No recent alteration in medications. No recent falls.      ED Course: No treatments in ER  Vitals  Labs significant for TSH 0.3   EKG  Imaging: CT head : Status post right frontal temporal craniotomy for surgical clipping in the right parasellar region. Metallic artifact limits evaluation of the surrounding parenchyma. Encephalomalacia on the right frontal lobe underlying the craniotomy is reidentified. Findings are unchanged since prior exam. The ventricles and sulci are prominent, compatible with age-related generalized cerebral volume loss.   There is no CT evidence for acute cerebral cortical infarct. There is no evidence of hemorrhage. Small chronic lacunar infarcts in the left basal ganglia are present. There is periventricular and subcortical white matter hypoattenuation,  most compatible with chronic microvascular ischemic changes.   No mass effect is found in the brain.  There is no midline shift or herniation pattern.   88 yo F PMHx Dementia, HTN , hypothyroidism, hyperthyroidism, brain aneurysm (1979), presenting to ER by family for gradual worsening of confusion and inability to perform ADL. This has been worsening for months, however symptoms first start 2 years ago. No acute deterioration. No recent illnesses per  and Daughter who are at bedside. No recent alteration in medications. No recent falls. Family reporting strange eating habits, binge eating followed by vomiting as well as refusing to eat foods pt previously liked. Family reporting difficulty with eating, swallowing and at times choking. Patient unable to climb stairs in home, can't dress herself or us toilet alone. Had an episode of wandering 8 months ago. Pt's  is sole caretaker at home, however also at an advanced age and no longer able to provide high level of care.       ED Course: No treatments in ER  Vitals T(F): 97.8, HR: 69, BP: 148/78, RR: 18, SpO2: 94% on room air   Labs significant for TSH 0.3   Imaging: CT head : Status post right frontal temporal craniotomy for surgical clipping in the right parasellar region. Metallic artifact limits evaluation of the surrounding parenchyma. Encephalomalacia on the right frontal lobe underlying the craniotomy is reidentified. Findings are unchanged since prior exam. The ventricles and sulci are prominent, compatible with age-related generalized cerebral volume loss.   There is no CT evidence for acute cerebral cortical infarct. There is no evidence of hemorrhage. Small chronic lacunar infarcts in the left basal ganglia are present. There is periventricular and subcortical white matter hypoattenuation,  most compatible with chronic microvascular ischemic changes.   No mass effect is found in the brain.  There is no midline shift or herniation pattern.   88 yo female with PMHx of dementia, HTN, HLD, hypothyroidism, hyperthyroidism, brain aneurysm (1979), presenting to ER by family for gradual worsening of confusion and inability to perform ADL. This has been worsening for months, however symptoms first start 2 years ago. No acute deterioration. No recent illnesses per  and Daughter who are at bedside. No recent alteration in medications. No recent falls. Family reporting strange eating habits, binge eating followed by vomiting as well as refusing to eat foods pt previously liked. Family reporting difficulty with eating, swallowing and at times choking. Patient unable to climb stairs in home, can't dress herself or us toilet alone. Had an episode of wandering 8 months ago. Pt's  is sole caretaker at home, however also at an advanced age and no longer able to provide high level of care.       ED Course: No treatments in ER  Vitals T(F): 97.8, HR: 69, BP: 148/78, RR: 18, SpO2: 94% on room air   Labs significant for TSH 0.03   Imaging: CT head : Status post right frontal temporal craniotomy for surgical clipping in the right parasellar region. Metallic artifact limits evaluation of the surrounding parenchyma. Encephalomalacia on the right frontal lobe underlying the craniotomy is reidentified. Findings are unchanged since prior exam. The ventricles and sulci are prominent, compatible with age-related generalized cerebral volume loss.   There is no CT evidence for acute cerebral cortical infarct. There is no evidence of hemorrhage. Small chronic lacunar infarcts in the left basal ganglia are present. There is periventricular and subcortical white matter hypoattenuation,  most compatible with chronic microvascular ischemic changes.   No mass effect is found in the brain.  There is no midline shift or herniation pattern.   89 yo female with PMHx of dementia, HTN, HLD, hypothyroidism, hyperthyroidism, brain aneurysm (1979), presenting to ER by family for gradual worsening of confusion and inability to perform ADL. This has been worsening for months, however symptoms first start 2 years ago. No acute deterioration. No recent illnesses per  and Daughter who are at bedside. No recent alteration in medications. No recent falls. Family reporting strange eating habits, binge eating followed by vomiting as well as refusing to eat foods pt previously liked. Family reporting difficulty with eating, swallowing and at times choking. Patient unable to climb stairs in home, can't dress herself or us toilet alone. Had an episode of wandering 8 months ago. Pt's  is sole caretaker at home, however also at an advanced age and no longer able to provide high level of care.  is HCP with daughter Rosa Elena [phone]. As per family, patient has living will stating DNR/DNI made before patient had dementia.       ED Course: No treatments in ER  Vitals T(F): 97.8, HR: 69, BP: 148/78, RR: 18, SpO2: 94% on room air   Labs significant for TSH 0.03   Imaging: CT head : Status post right frontal temporal craniotomy for surgical clipping in the right parasellar region. Metallic artifact limits evaluation of the surrounding parenchyma. Encephalomalacia on the right frontal lobe underlying the craniotomy is reidentified. Findings are unchanged since prior exam. The ventricles and sulci are prominent, compatible with age-related generalized cerebral volume loss.   There is no CT evidence for acute cerebral cortical infarct. There is no evidence of hemorrhage. Small chronic lacunar infarcts in the left basal ganglia are present. There is periventricular and subcortical white matter hypoattenuation,  most compatible with chronic microvascular ischemic changes.   No mass effect is found in the brain.  There is no midline shift or herniation pattern.   88 yo female with PMHx of dementia, HTN, HLD, hypothyroidism, hyperthyroidism, brain aneurysm (1979), presenting to ER by family for gradual worsening of confusion and inability to perform ADL. This has been worsening for months, however symptoms first start 2 years ago. No acute deterioration. No recent illnesses per  and Daughter who are at bedside. No recent alteration in medications. No recent falls. Family reporting strange eating habits, binge eating followed by vomiting as well as refusing to eat foods pt previously liked. Family reporting difficulty with eating, swallowing and at times choking. Patient unable to climb stairs in home, can't dress herself or us toilet alone. Had an episode of wandering 8 months ago. Pt's  is sole caretaker at home, however also at an advanced age and no longer able to provide high level of care.  is HCP with daughter Rosa Elena [phone]. As per family, patient has living will stating DNR/DNI made before patient had dementia.     ED Course: No treatments in ER  Vitals T(F): 97.8, HR: 69, BP: 148/78, RR: 18, SpO2: 94% on room air   Labs significant for TSH 0.03   Imaging: CT head : Status post right frontal temporal craniotomy for surgical clipping in the right parasellar region. Metallic artifact limits evaluation of the surrounding parenchyma. Encephalomalacia on the right frontal lobe underlying the craniotomy is reidentified. Findings are unchanged since prior exam. The ventricles and sulci are prominent, compatible with age-related generalized cerebral volume loss.   There is no CT evidence for acute cerebral cortical infarct. There is no evidence of hemorrhage. Small chronic lacunar infarcts in the left basal ganglia are present. There is periventricular and subcortical white matter hypoattenuation,  most compatible with chronic microvascular ischemic changes.   No mass effect is found in the brain.  There is no midline shift or herniation pattern.   89 yo female with PMHx of dementia, HTN, HLD, hypothyroidism, hyperthyroidism, brain aneurysm (1979), presenting to ER by family for gradual worsening of confusion and inability to perform ADL. This has been worsening for months, however symptoms first start 2 years ago. No acute deterioration. No recent illnesses per  and Daughter who are at bedside. No recent alteration in medications. No recent falls. Family reporting strange eating habits, binge eating followed by vomiting as well as refusing to eat foods pt previously liked. Family reporting difficulty with eating, swallowing and at times choking. Patient unable to climb stairs in home, can't dress herself or us toilet alone. Had an episode of wandering 8 months ago. Pt's  is sole caretaker at home, however also at an advanced age and no longer able to provide high level of care. Family requesting long term nursing facility placement.  is HCP with daughter Rosa Elena [phone]. As per family, patient has living will stating DNR/DNI made before patient had dementia.     ED Course: No treatments in ER  Vitals T(F): 97.8, HR: 69, BP: 148/78, RR: 18, SpO2: 94% on room air   Labs significant for TSH 0.03   Imaging: CT head : Status post right frontal temporal craniotomy for surgical clipping in the right parasellar region. Metallic artifact limits evaluation of the surrounding parenchyma. Encephalomalacia on the right frontal lobe underlying the craniotomy is reidentified. Findings are unchanged since prior exam. The ventricles and sulci are prominent, compatible with age-related generalized cerebral volume loss.   There is no CT evidence for acute cerebral cortical infarct. There is no evidence of hemorrhage. Small chronic lacunar infarcts in the left basal ganglia are present. There is periventricular and subcortical white matter hypoattenuation,  most compatible with chronic microvascular ischemic changes.   No mass effect is found in the brain.  There is no midline shift or herniation pattern.

## 2021-06-12 NOTE — ED ADULT TRIAGE NOTE - WEIGHT IN LBS
115
Pt received semi-supine in bed +heplock, +PEG, +abarca, +rectal tube, +tele, +Trach to vent FiO2 40%, +jejunostomy, +nephrostomy, +B/L z-flow boots, in NAD

## 2021-06-12 NOTE — H&P ADULT - NSHPSOCIALHISTORY_GEN_ALL_CORE
Lives at __________ with  and ____  Ambulates with_____  Performs _________ADLs  Tobacco Hx:  Etoh Hx; Tobacco: former smoker, quit at age 45, smoked for 25 years about 4-5 cigarettes/day    EtOH: Denies   Recreational drug use: Denies  Lives with:    Ambulates: with walker and assitance  ADLs: unable to perform all ADLs   Occupation: retired Tobacco: former smoker, quit at age 45, smoked for 25 years about 4-5 cigarettes/day    EtOH: Denies   Recreational drug use: Denies  Lives with:    Ambulates: with walker and assitance  ADLs: unable to perform all ADLs   Occupation: retired  Moderna 2/2, last dose 4/15/21

## 2021-06-12 NOTE — H&P ADULT - PROBLEM SELECTOR PLAN 2
As per , patient dx with hyperthyroid originally, treated with iodine. However now hypothyroid.   - TSH low a 0.03 on admission  - Hold home levothyroxine in the setting of low TSH  - Endocrine Dr Perlman consulted, f/u recs As per family, pt with hx of choking and difficulty swallowing  - Will get bedside swallow eval and consider S&S eval   - NPO except meds for now, consider advancing as tolerated As per family, pt with hx of choking and difficulty swallowing  - Will get bedside swallow eval and consider S&S eval   - NPO except meds for now, consider advancing as tolerated  - Aspiration precautions As per family, pt with hx of choking and difficulty swallowing  - Will get bedside swallow eval and consider S&S eval   - NPO except meds for now, consider advancing as tolerated  - Aspiration precautions  - Nutrition consulted for malnourishment, f/u recs

## 2021-06-12 NOTE — ED PROVIDER NOTE - PATIENT PORTAL LINK FT
You can access the FollowMyHealth Patient Portal offered by [facility] by registering at the following website: [email or web address]. By joining [facility]’s FollowMyHealth portal, you will also be able to view your health information using other applications (apps) compatible with our system.

## 2021-06-12 NOTE — H&P ADULT - NSICDXPASTMEDICALHX_GEN_ALL_CORE_FT
PAST MEDICAL HISTORY:  Dementia     HTN (hypertension)     Hypothyroid      PAST MEDICAL HISTORY:  Brain aneurysm in 1979    Dementia     History of hyperthyroidism     HTN (hypertension)     Hypothyroid      PAST MEDICAL HISTORY:  Brain aneurysm in 1979    Dementia     History of hyperthyroidism     HLD (hyperlipidemia)     HTN (hypertension)     Hypothyroid

## 2021-06-12 NOTE — PATIENT PROFILE ADULT - VISION (WITH CORRECTIVE LENSES IF THE PATIENT USUALLY WEARS THEM):
glasses- vision/Partially impaired: cannot see medication labels or newsprint, but can see obstacles in path, and the surrounding layout; can count fingers at arm's length

## 2021-06-12 NOTE — ED ADULT NURSE REASSESSMENT NOTE - NS ED NURSE REASSESS COMMENT FT1
Dr. Allan made aware patient passed dysphagia screen. Diet to be changed from NPO. Denae MAK updated. Jerrell MAK

## 2021-06-12 NOTE — ED PROVIDER NOTE - PROGRESS NOTE DETAILS
TSH elevated which most likely is related to excessive exogenous thyroid hormone the dose of which will need to be adjusted by PCP, Dr. Horner. At time of discharge  stated that he no longer can take care of herself at home and wants to place patient in a "institution".

## 2021-06-12 NOTE — H&P ADULT - PROBLEM SELECTOR PLAN 3
Chronic,  on admission  - Continue home BP meds amlodipine 2.5mg and HCTZ 25mg po qd with hold parameters  - Monitor routine hemodynamics As per , patient dx with hyperthyroid originally, treated with iodine. However now hypothyroid.   - TSH low a 0.03 on admission  - Hold home levothyroxine in the setting of low TSH  - Endocrine Dr Perlman consulted, f/u recs As per , patient dx with hyperthyroid originally, treated with iodine. However now hypothyroid.   - TSH low a 0.03 on admission  - Hold home levothyroxine in the setting of low TSH, consider restarting pending clinical course  - F/u AM thyroid panel   - Endocrine Dr Perlman consulted, f/u recs

## 2021-06-12 NOTE — ED PROVIDER NOTE - OBJECTIVE STATEMENT
88 yo white female with H/O Dementia    HTN (hypertension) and  Hypothyroidism brought here by family for gradual worsening of confusion and inability to perform ADL. This has been worsening x months. No acute deterioration. No recent illnesses per  and Daughter who are at bedside. No recent alteration in medications. No recent falls.

## 2021-06-13 LAB
ANION GAP SERPL CALC-SCNC: 9 MMOL/L — SIGNIFICANT CHANGE UP (ref 5–17)
BUN SERPL-MCNC: 15 MG/DL — SIGNIFICANT CHANGE UP (ref 7–23)
CALCIUM SERPL-MCNC: 9.1 MG/DL — SIGNIFICANT CHANGE UP (ref 8.5–10.1)
CHLORIDE SERPL-SCNC: 106 MMOL/L — SIGNIFICANT CHANGE UP (ref 96–108)
CO2 SERPL-SCNC: 27 MMOL/L — SIGNIFICANT CHANGE UP (ref 22–31)
COVID-19 SPIKE DOMAIN AB INTERP: POSITIVE
COVID-19 SPIKE DOMAIN ANTIBODY RESULT: >250 U/ML — HIGH
CREAT SERPL-MCNC: 0.54 MG/DL — SIGNIFICANT CHANGE UP (ref 0.5–1.3)
GLUCOSE SERPL-MCNC: 84 MG/DL — SIGNIFICANT CHANGE UP (ref 70–99)
HCT VFR BLD CALC: 41.1 % — SIGNIFICANT CHANGE UP (ref 34.5–45)
HGB BLD-MCNC: 13.8 G/DL — SIGNIFICANT CHANGE UP (ref 11.5–15.5)
MCHC RBC-ENTMCNC: 28.8 PG — SIGNIFICANT CHANGE UP (ref 27–34)
MCHC RBC-ENTMCNC: 33.6 GM/DL — SIGNIFICANT CHANGE UP (ref 32–36)
MCV RBC AUTO: 85.8 FL — SIGNIFICANT CHANGE UP (ref 80–100)
NRBC # BLD: 0 /100 WBCS — SIGNIFICANT CHANGE UP (ref 0–0)
PLATELET # BLD AUTO: 220 K/UL — SIGNIFICANT CHANGE UP (ref 150–400)
POTASSIUM SERPL-MCNC: 3.1 MMOL/L — LOW (ref 3.5–5.3)
POTASSIUM SERPL-SCNC: 3.1 MMOL/L — LOW (ref 3.5–5.3)
RBC # BLD: 4.79 M/UL — SIGNIFICANT CHANGE UP (ref 3.8–5.2)
RBC # FLD: 13.7 % — SIGNIFICANT CHANGE UP (ref 10.3–14.5)
SARS-COV-2 IGG+IGM SERPL QL IA: >250 U/ML — HIGH
SARS-COV-2 IGG+IGM SERPL QL IA: POSITIVE
SODIUM SERPL-SCNC: 142 MMOL/L — SIGNIFICANT CHANGE UP (ref 135–145)
T3FREE SERPL-MCNC: 2.48 PG/ML — SIGNIFICANT CHANGE UP (ref 1.8–4.6)
T4 FREE SERPL-MCNC: 1.6 NG/DL — SIGNIFICANT CHANGE UP (ref 0.9–1.8)
TSH SERPL-MCNC: 0.03 UIU/ML — LOW (ref 0.36–3.74)
WBC # BLD: 7.76 K/UL — SIGNIFICANT CHANGE UP (ref 3.8–10.5)
WBC # FLD AUTO: 7.76 K/UL — SIGNIFICANT CHANGE UP (ref 3.8–10.5)

## 2021-06-13 PROCEDURE — 99232 SBSQ HOSP IP/OBS MODERATE 35: CPT | Mod: GC

## 2021-06-13 RX ORDER — POTASSIUM CHLORIDE 20 MEQ
40 PACKET (EA) ORAL EVERY 4 HOURS
Refills: 0 | Status: COMPLETED | OUTPATIENT
Start: 2021-06-13 | End: 2021-06-13

## 2021-06-13 RX ORDER — LEVOTHYROXINE SODIUM 125 MCG
75 TABLET ORAL DAILY
Refills: 0 | Status: DISCONTINUED | OUTPATIENT
Start: 2021-06-13 | End: 2021-06-18

## 2021-06-13 RX ADMIN — Medication 25 MILLIGRAM(S): at 05:43

## 2021-06-13 RX ADMIN — Medication 40 MILLIGRAM(S): at 21:35

## 2021-06-13 RX ADMIN — Medication 75 MICROGRAM(S): at 12:32

## 2021-06-13 RX ADMIN — Medication 5 MILLIGRAM(S): at 05:42

## 2021-06-13 RX ADMIN — AMLODIPINE BESYLATE 2.5 MILLIGRAM(S): 2.5 TABLET ORAL at 05:42

## 2021-06-13 RX ADMIN — Medication 40 MILLIEQUIVALENT(S): at 08:19

## 2021-06-13 RX ADMIN — Medication 40 MILLIEQUIVALENT(S): at 12:30

## 2021-06-13 NOTE — DISCHARGE NOTE PROVIDER - CARE PROVIDER_API CALL
LIZZIE BOSS  [facility]  [address] [place] [address]  Phone: [phone]  Fax: ([phone]  Established Patient  Follow Up Time: 1 week

## 2021-06-13 NOTE — DIETITIAN INITIAL EVALUATION ADULT. - PROBLEM SELECTOR PLAN 3
As per , patient dx with hyperthyroid originally, treated with iodine. However now hypothyroid.   - TSH low a 0.03 on admission  - Hold home levothyroxine in the setting of low TSH, consider restarting pending clinical course  - F/u AM thyroid panel   - Endocrine Dr Perlman consulted, f/u recs

## 2021-06-13 NOTE — PROGRESS NOTE ADULT - ASSESSMENT
89 yo female with PMHx of dementia, HTN, HLD, hypothyroidism, hyperthyroidism, brain aneurysm (1979), presenting to ER by family for gradual worsening of confusion and inability to perform ADLs. Patient admitted for placement and neurology eval.

## 2021-06-13 NOTE — DIETITIAN INITIAL EVALUATION ADULT. - OTHER INFO
As per chart pt is a 89 year old female with a PMH of  dementia, HTN, HLD, hypothyroidism, hyperthyroidism, brain aneurysm (1979), presenting to ER by family for gradual worsening of confusion and inability to perform ADL. Patient admitted for placement and neurology eval.     Pt seen at bedside. Pt's admission weight 115.1lbs. As per chart pt is a 89 year old female with a PMH of  dementia, HTN, HLD, hypothyroidism, hyperthyroidism, brain aneurysm (1979), presenting to ER by family for gradual worsening of confusion and inability to perform ADL. Patient admitted for placement and neurology eval.     Pt seen at bedside, speaking illogically, unable to provide information. Spoke to pt's daughter over the phone to obtain subjective information. Per RN pt consumed about 50% of breakfast this morning, however needed to be fed otherwise would not eat as she was unable to feed herself, did not have any issues with chewing/ swallowing the meal. Pt's admission weight 115.1lbs. Pt's daughter unsure of pt's current weight thinks it is around 120lbs, admits pt with weight loss over the past year states her UBW is 135-138lbs x 1 year ago. Daughter has also noticed her clothes fitting looser. Nutrition Focused Physical Exam conducted with findings of muscle and fat loss. Pt meets criteria for malnutrition at this time. No GI distress noted at this time, +BM 6/12.     No pressure injuries noted at this time

## 2021-06-13 NOTE — DIETITIAN INITIAL EVALUATION ADULT. - PROBLEM SELECTOR PLAN 1
No official dx given by PMD however patient showing stereotypical signs for >2years. Patient deconditioned, not able to perform any ADLS. Family unable to care for pt at home. Family requesting long term facility.  - CT head: Status post right frontal temporal craniotomy for surgical clipping in the right parasellar region. Metallic artifact limits evaluation of the surrounding parenchyma. Encephalomalacia on the right frontal lobe underlying the craniotomy is reidentified. Findings are unchanged since prior exam. The ventricles and sulci are prominent, compatible with age-related generalized cerebral volume loss. There is no CT evidence for acute cerebral cortical infarct. There is no evidence of hemorrhage. Small chronic lacunar infarcts in the left basal ganglia are present. There is periventricular and subcortical white matter hypoattenuation,  most compatible with chronic microvascular ischemic changes.  No mass effect is found in the brain. There is no midline shift or herniation pattern.  - Pt will likely require long term nursing facility  - Enhanced supervision for now, consider 1:1 obs if patient becomes agitated or is flight risk   - Fall and aspiration precautions  - Continue home buspar 5mg qd   - Neurology consulted Dr Kraus, f/u recs  - PT, nutrition & SW consulted  - Palliative Dr Garcia consulted for GOC, molst completion and advance care planning

## 2021-06-13 NOTE — DIETITIAN INITIAL EVALUATION ADULT. - PROBLEM SELECTOR PLAN 4
Chronic,  on admission  - Continue home BP meds amlodipine 2.5mg and HCTZ 25mg po qd with hold parameters  - Monitor routine hemodynamics

## 2021-06-13 NOTE — CONSULT NOTE ADULT - SUBJECTIVE AND OBJECTIVE BOX
Patient is a 89y old  Female who presents with a chief complaint of failure to thrive (12 Jun 2021 15:22)      Reason For Consult: abnl tfts    HPI:  89 yo female with PMHx of dementia, HTN, HLD, hypothyroidism, hyperthyroidism, brain aneurysm (1979), presenting to ER by family for gradual worsening of confusion and inability to perform ADL. This has been worsening for months, however symptoms first start 2 years ago. No acute deterioration. No recent illnesses per  and Daughter who are at bedside. No recent alteration in medications. No recent falls. Family reporting strange eating habits, binge eating followed by vomiting as well as refusing to eat foods pt previously liked. Family reporting difficulty with eating, swallowing and at times choking. Patient unable to climb stairs in home, can't dress herself or us toilet alone. Had an episode of wandering 8 months ago. Pt's  is sole caretaker at home, however also at an advanced age and no longer able to provide high level of care. Family requesting long term nursing facility placement.  is HCP with daughter Rosa Elena [phone]. As per family, patient has living will stating DNR/DNI made before patient had dementia.     ED Course: No treatments in ER  Vitals T(F): 97.8, HR: 69, BP: 148/78, RR: 18, SpO2: 94% on room air   Labs significant for TSH 0.03   Imaging: CT head : Status post right frontal temporal craniotomy for surgical clipping in the right parasellar region. Metallic artifact limits evaluation of the surrounding parenchyma. Encephalomalacia on the right frontal lobe underlying the craniotomy is reidentified. Findings are unchanged since prior exam. The ventricles and sulci are prominent, compatible with age-related generalized cerebral volume loss.   There is no CT evidence for acute cerebral cortical infarct. There is no evidence of hemorrhage. Small chronic lacunar infarcts in the left basal ganglia are present. There is periventricular and subcortical white matter hypoattenuation,  most compatible with chronic microvascular ischemic changes.   No mass effect is found in the brain.  There is no midline shift or herniation pattern.   (12 Jun 2021 15:22)      PAST MEDICAL & SURGICAL HISTORY:  Hypothyroid    HTN (hypertension)    Dementia    Brain aneurysm  in 1979    History of hyperthyroidism    HLD (hyperlipidemia)    S/P hysterectomy    H/O inguinal hernia repair  BILATERAL        FAMILY HISTORY:  No pertinent family history in first degree relatives          Social History:    MEDICATIONS  (STANDING):  amLODIPine   Tablet 2.5 milliGRAM(s) Oral daily  busPIRone 5 milliGRAM(s) Oral <User Schedule>  hydrochlorothiazide 25 milliGRAM(s) Oral daily  potassium chloride   Powder 40 milliEquivalent(s) Oral every 4 hours  pravastatin 40 milliGRAM(s) Oral at bedtime    MEDICATIONS  (PRN):        T(C): 36.8 (06-13-21 @ 05:29), Max: 37 (06-12-21 @ 20:49)  HR: 65 (06-13-21 @ 05:29) (56 - 69)  BP: 117/58 (06-13-21 @ 05:29) (117/58 - 167/68)  RR: 17 (06-13-21 @ 05:29) (17 - 18)  SpO2: 94% (06-13-21 @ 05:29) (94% - 97%)  Wt(kg): --    PHYSICAL EXAM:  HEAD:  Atraumatic, Normocephalic  NECK: Supple, No JVD, Normal thyroid  CHEST/LUNG: Clear to percussion bilaterally; No rales, rhonchi, wheezing, or rubs  HEART: Regular rate and rhythm; No murmurs, rubs, or gallops  ABDOMEN: Soft, Nontender, Nondistended; Bowel sounds present  EXTREMITIES:  2+ Peripheral Pulses, No clubbing, cyanosis, or edema  SKIN: No rashes or lesions    CAPILLARY BLOOD GLUCOSE                                13.8   7.76  )-----------( 220      ( 13 Jun 2021 06:05 )             41.1       CMP:  06-13 @ 06:05  SGPT --  Albumin --   Alk Phos --   Anion Gap 9   SGOT --   Total Bili --   BUN 15   Calcium Total 9.1   CO2 27   Chloride 106   Creatinine 0.54   eGFR if AA 97   eGFR if non AA 84   Glucose 84   Potassium 3.1   Protein --   Sodium 142      Thyroid Function Tests:  06-13 @ 06:05 TSH 0.03 FreeT4 -- T3 -- Anti TPO -- Anti Thyroglobulin Ab -- TSI --  06-12 @ 11:29 TSH 0.03 FreeT4 -- T3 -- Anti TPO -- Anti Thyroglobulin Ab -- TSI --      Diabetes Tests:       Radiology:

## 2021-06-13 NOTE — DIETITIAN INITIAL EVALUATION ADULT. - PROBLEM SELECTOR PLAN 2
As per family, pt with hx of choking and difficulty swallowing  - Will get bedside swallow eval and consider S&S eval   - NPO except meds for now, consider advancing as tolerated  - Aspiration precautions  - Nutrition consulted for malnourishment, f/u recs

## 2021-06-13 NOTE — PHYSICAL THERAPY INITIAL EVALUATION ADULT - RANGE OF MOTION EXAMINATION, REHAB EVAL
required PROM of BUE/bilateral upper extremity ROM was WFL (within functional limits)/bilateral lower extremity ROM was WFL (within functional limits)

## 2021-06-13 NOTE — PROGRESS NOTE ADULT - PROBLEM SELECTOR PLAN 2
As per family, pt with hx of choking and difficulty swallowing  - Dysphagia 3 soft solids thin liquids diet per bedside swallow assessment  - F/u official S&S eval  - Aspiration precautions  - Nutrition consulted for malnourishment, f/u recs As per family, pt with hx of choking and difficulty swallowing  - Dysphagia 3 soft solids thin liquids diet per bedside swallow assessment  - F/u official S&S eval  - Aspiration precautions  - Nutrition consulted for malnourishment--deangelo Ensure Enlive BID

## 2021-06-13 NOTE — DISCHARGE NOTE PROVIDER - NSDCMRMEDTOKEN_GEN_ALL_CORE_FT
amLODIPine 2.5 mg oral tablet: 1 tab(s) orally once a day  busPIRone 5 mg oral tablet: 1 tab(s) orally once a day  hydroCHLOROthiazide 25 mg oral tablet: 1 tab(s) orally once a day  levothyroxine 88 mcg (0.088 mg) oral capsule: 1 cap(s) orally once a day  potassium gluconate 550 mg oral tablet: 1 tab(s) orally once a day- OTC  pravastatin 40 mg oral tablet: 1 tab(s) orally once a day   amLODIPine 2.5 mg oral tablet: 1 tab(s) orally once a day  busPIRone 5 mg oral tablet: 1 tab(s) orally once a day  hydroCHLOROthiazide 25 mg oral tablet: 1 tab(s) orally once a day  potassium gluconate 550 mg oral tablet: 1 tab(s) orally once a day- OTC  pravastatin 40 mg oral tablet: 1 tab(s) orally once a day  Synthroid 75 mcg (0.075 mg) oral tablet: 1 tab(s) orally once a day   amLODIPine 2.5 mg oral tablet: 1 tab(s) orally once a day  busPIRone 5 mg oral tablet: 1 tab(s) orally once a day  hydroCHLOROthiazide 25 mg oral tablet: 1 tab(s) orally once a day  potassium gluconate 550 mg oral tablet: 1 tab(s) orally once a day- OTC  pravastatin 40 mg oral tablet: 1 tab(s) orally once a day  risperiDONE 0.25 mg oral tablet: 1 tab(s) orally every 12 hours, As needed, Agitation  Synthroid 75 mcg (0.075 mg) oral tablet: 1 tab(s) orally once a day

## 2021-06-13 NOTE — DISCHARGE NOTE PROVIDER - INSTRUCTIONS
Pureed foods with thickened liquids via a teaspoon. Always eat sitting upright. Pureed foods with thickened liquids via a teaspoon. Always eat sitting upright.  Ensure Enlive supplements two times per day.

## 2021-06-13 NOTE — PHYSICAL THERAPY INITIAL EVALUATION ADULT - ADDITIONAL COMMENTS
patient is a poor historian due to extreme dementia. As per EMR patient requires assistance with everything and lives with her  who has been taking care of her.

## 2021-06-13 NOTE — PROGRESS NOTE ADULT - PROBLEM SELECTOR PLAN 1
No official dx given by PMD however patient showing stereotypical signs for >2years. Patient deconditioned, not able to perform any ADLS. Family unable to care for pt at home. Family requesting long term facility.  - CT head: Status post right frontal temporal craniotomy for surgical clipping in the right parasellar region. Metallic artifact limits evaluation of the surrounding parenchyma. Encephalomalacia on the right frontal lobe underlying the craniotomy is reidentified. Findings are unchanged since prior exam. The ventricles and sulci are prominent, compatible with age-related generalized cerebral volume loss. There is no CT evidence for acute cerebral cortical infarct. There is no evidence of hemorrhage. Small chronic lacunar infarcts in the left basal ganglia are present. There is periventricular and subcortical white matter hypoattenuation,  most compatible with chronic microvascular ischemic changes.  No mass effect is found in the brain. There is no midline shift or herniation pattern.  - Pt will likely require long term nursing facility  - Enhanced supervision for now, can start low dose Risperdal or Zyprexa if needed for agitation  - Fall and aspiration precautions  - Continue home buspar 5mg qd   - Neuro Dr. Kraus consulted--f/u B12, folate, ammonia levels  - PT, nutrition & SW consulted  - Palliative Dr Garcia consulted for GOC, molst completion and advance care planning No official dx given by PMD however patient showing stereotypical signs for >2years. Patient deconditioned, not able to perform any ADLS. Family unable to care for pt at home. Family requesting long term facility.  - CT head: Status post right frontal temporal craniotomy for surgical clipping in the right parasellar region. Metallic artifact limits evaluation of the surrounding parenchyma. Encephalomalacia on the right frontal lobe underlying the craniotomy is reidentified. Findings are unchanged since prior exam. The ventricles and sulci are prominent, compatible with age-related generalized cerebral volume loss. There is no CT evidence for acute cerebral cortical infarct. There is no evidence of hemorrhage. Small chronic lacunar infarcts in the left basal ganglia are present. There is periventricular and subcortical white matter hypoattenuation,  most compatible with chronic microvascular ischemic changes.  No mass effect is found in the brain. There is no midline shift or herniation pattern.  - Pt will likely require long term nursing facility  - Enhanced supervision for now, can start low dose Risperdal or Zyprexa if needed for agitation  - Fall and aspiration precautions  - Continue home buspar 5mg qd   - Neuro Dr. Kraus consulted--f/u B12, folate, ammonia levels  - PT consulted--recommend [facility] vs [facility]  - Nutrition consulted--recommend Ensure Enlive BID  - SW consulted  - Palliative Dr Garcia consulted for GOC, molst completion and advance care planning

## 2021-06-13 NOTE — DISCHARGE NOTE PROVIDER - NSDCCPCAREPLAN_GEN_ALL_CORE_FT
PRINCIPAL DISCHARGE DIAGNOSIS  Diagnosis: Dementia with behavioral disturbance, unspecified dementia type  Assessment and Plan of Treatment: You came to the hospital with increasing confusion and agitation over the past 2 years. CT of your head did not show any new changes. Our neurologist saw you.   - Continue Buspar 5mg daily as you were previously taking      SECONDARY DISCHARGE DIAGNOSES  Diagnosis: Dysphagia  Assessment and Plan of Treatment:     Diagnosis: Hypothyroid  Assessment and Plan of Treatment: Your thyroid hormone level was found to be low. Our endocrinologist saw you and recommended decreasing the dose of Synthroid.  - START decreased dose of Synthroid 75mcg daily  - Follow up with your PCP within 1 week  - Have your thyroid function checked in 6 weeks    Diagnosis: HTN (hypertension)  Assessment and Plan of Treatment: Continue Norvasc 2.5mg daily and HCTZ 25mg daily as you were previously taking    Diagnosis: HLD (hyperlipidemia)  Assessment and Plan of Treatment: Continue pravastatin 40mg daily as you were previously taking     PRINCIPAL DISCHARGE DIAGNOSIS  Diagnosis: Dementia with behavioral disturbance, unspecified dementia type  Assessment and Plan of Treatment: You came to the hospital with increasing confusion and agitation over the past 2 years. CT of your head did not show any new changes. Our neurologist saw you. Your symptoms are likely due to progressive dementia. Your vitamin B12, folate, and ammonia levels were all normal. You did not have any signs of infection.  - Continue Buspar 5mg daily as you were previously taking      SECONDARY DISCHARGE DIAGNOSES  Diagnosis: Dysphagia  Assessment and Plan of Treatment: It is recommended that you eat pureed foods with thickened liquids via a teaspoon. Always eat sitting upright.    Diagnosis: Hypothyroid  Assessment and Plan of Treatment: Your thyroid hormone level was found to be low. Our endocrinologist saw you and recommended decreasing the dose of Synthroid.  - START decreased dose of Synthroid 75mcg daily  - Follow up with your PCP within 1 week  - Have your thyroid function checked in 6 weeks    Diagnosis: HTN (hypertension)  Assessment and Plan of Treatment: Continue Norvasc 2.5mg daily and HCTZ 25mg daily as you were previously taking    Diagnosis: HLD (hyperlipidemia)  Assessment and Plan of Treatment: Continue pravastatin 40mg daily as you were previously taking     PRINCIPAL DISCHARGE DIAGNOSIS  Diagnosis: Dementia with behavioral disturbance, unspecified dementia type  Assessment and Plan of Treatment: You came to the hospital with increasing confusion and agitation over the past 2 years. CT of your head did not show any new changes. Our neurologist saw you. Your symptoms are likely due to progressive dementia. Your vitamin B12, folate, and ammonia levels were all normal. You did not have any signs of infection.  - Continue Buspar 5mg daily as you were previously taking  - Follow up with your PCP within 1 week      SECONDARY DISCHARGE DIAGNOSES  Diagnosis: Dysphagia  Assessment and Plan of Treatment: It is recommended that you eat pureed foods with thickened liquids via a teaspoon. Always eat sitting upright.    Diagnosis: Hypothyroid  Assessment and Plan of Treatment: Your thyroid hormone level was found to be low. Our endocrinologist saw you and recommended decreasing the dose of Synthroid.  - START decreased dose of Synthroid 75mcg daily  - Follow up with your PCP within 1 week  - Have your thyroid function checked in 6 weeks    Diagnosis: HTN (hypertension)  Assessment and Plan of Treatment: Continue Norvasc 2.5mg daily and HCTZ 25mg daily as you were previously taking    Diagnosis: HLD (hyperlipidemia)  Assessment and Plan of Treatment: Continue pravastatin 40mg daily as you were previously taking     PRINCIPAL DISCHARGE DIAGNOSIS  Diagnosis: Dementia with behavioral disturbance, unspecified dementia type  Assessment and Plan of Treatment: You came to the hospital with increasing confusion and agitation over the past 2 years. CT of your head did not show any new changes. Our neurologist saw you. Your symptoms are likely due to progressive dementia. Your vitamin B12, folate, and ammonia levels were all normal. You did not have any signs of infection.  - Continue Buspar 5mg daily as you were previously taking  - Follow up with your PCP within 1 week      SECONDARY DISCHARGE DIAGNOSES  Diagnosis: Dysphagia  Assessment and Plan of Treatment: It is recommended that you eat pureed foods with thickened liquids via a teaspoon. Always eat sitting upright.  Drink Ensure Enlive two times per day for extra calories.    Diagnosis: Hypothyroid  Assessment and Plan of Treatment: Your thyroid hormone level was found to be low. Our endocrinologist saw you and recommended decreasing the dose of Synthroid.  - START decreased dose of Synthroid 75mcg daily  - Follow up with your PCP within 1 week  - Have your thyroid function checked in 6 weeks    Diagnosis: HTN (hypertension)  Assessment and Plan of Treatment: Continue Norvasc 2.5mg daily and HCTZ 25mg daily as you were previously taking    Diagnosis: HLD (hyperlipidemia)  Assessment and Plan of Treatment: Continue pravastatin 40mg daily as you were previously taking     PRINCIPAL DISCHARGE DIAGNOSIS  Diagnosis: Dementia with behavioral disturbance, unspecified dementia type  Assessment and Plan of Treatment: You came to the hospital with increasing confusion and agitation over the past 2 years. CT of your head did not show any new changes. Our neurologist saw you. Your symptoms are likely due to progressive dementia. Your vitamin B12, folate, and ammonia levels were all normal. You did not have any signs of infection.  - Continue Buspar 5mg daily as you were previously taking  - Take Risperdal 0.25mg two times per day as needed for agitation  - Follow up with your PCP within 1 week      SECONDARY DISCHARGE DIAGNOSES  Diagnosis: Dysphagia  Assessment and Plan of Treatment: It is recommended that you eat pureed foods with thickened liquids via a teaspoon. Always eat sitting upright.  Drink Ensure Enlive two times per day for extra calories.    Diagnosis: Hypothyroid  Assessment and Plan of Treatment: Your thyroid hormone level was found to be low. Our endocrinologist saw you and recommended decreasing the dose of Synthroid.  - START decreased dose of Synthroid 75mcg daily  - Follow up with your PCP within 1 week  - Have your thyroid function checked in 6 weeks    Diagnosis: HTN (hypertension)  Assessment and Plan of Treatment: Continue Norvasc 2.5mg daily and HCTZ 25mg daily as you were previously taking    Diagnosis: HLD (hyperlipidemia)  Assessment and Plan of Treatment: Continue pravastatin 40mg daily as you were previously taking

## 2021-06-13 NOTE — DIETITIAN INITIAL EVALUATION ADULT. - ADD RECOMMEND
1) Continue with Regular diet, defer texture and consistency to SLP, 2) Recommend Ensure Enlive BID to provide additional source of energy and protein, 3) Encourage adequate PO intake, continue to provide pt with meal assistance, 4) Monitor pt's PO intake, weight, skin, edema, GI distress 1) Continue with Regular diet, defer texture and consistency to SLP, 2) Recommend Ensure Enlive BID to provide additional source of energy and protein, 3) Encourage adequate PO intake, food preferences obtained from pt's daughter, continue to provide pt with meal assistance, 4) Monitor pt's PO intake, weight, skin, edema, GI distress

## 2021-06-13 NOTE — DISCHARGE NOTE PROVIDER - DETAILS OF MALNUTRITION DIAGNOSIS/DIAGNOSES
This patient has been assessed with a concern for Malnutrition and was treated during this hospitalization for the following Nutrition diagnosis/diagnoses:     -  06/13/2021: Moderate protein-calorie malnutrition

## 2021-06-13 NOTE — DISCHARGE NOTE PROVIDER - HOSPITAL COURSE
FROM ADMISSION H+P:   HPI:  89 yo female with PMHx of dementia, HTN, HLD, hypothyroidism, hyperthyroidism, brain aneurysm (1979), presenting to ER by family for gradual worsening of confusion and inability to perform ADL. This has been worsening for months, however symptoms first start 2 years ago. No acute deterioration. No recent illnesses per  and Daughter who are at bedside. No recent alteration in medications. No recent falls. Family reporting strange eating habits, binge eating followed by vomiting as well as refusing to eat foods pt previously liked. Family reporting difficulty with eating, swallowing and at times choking. Patient unable to climb stairs in home, can't dress herself or us toilet alone. Had an episode of wandering 8 months ago. Pt's  is sole caretaker at home, however also at an advanced age and no longer able to provide high level of care. Family requesting long term nursing facility placement.  is HCP with daughter Rosa Elena [phone]. As per family, patient has living will stating DNR/DNI made before patient had dementia.     ED Course: No treatments in ER  Vitals T(F): 97.8, HR: 69, BP: 148/78, RR: 18, SpO2: 94% on room air   Labs significant for TSH 0.03   Imaging: CT head : Status post right frontal temporal craniotomy for surgical clipping in the right parasellar region. Metallic artifact limits evaluation of the surrounding parenchyma. Encephalomalacia on the right frontal lobe underlying the craniotomy is reidentified. Findings are unchanged since prior exam. The ventricles and sulci are prominent, compatible with age-related generalized cerebral volume loss.   There is no CT evidence for acute cerebral cortical infarct. There is no evidence of hemorrhage. Small chronic lacunar infarcts in the left basal ganglia are present. There is periventricular and subcortical white matter hypoattenuation,  most compatible with chronic microvascular ischemic changes.   No mass effect is found in the brain.  There is no midline shift or herniation pattern.   (12 Jun 2021 15:22)      ---  HOSPITAL COURSE:   Patient was admitted to the hospital for neurology evaluation and for placement. Placed on fall and aspiration precautions. Neurology consulted. B12 _____, folate _____, ammonia level _____. Buspar was continued. Nutrition recommended adding Ensure Enlive BID to meals. Per speech and swallow evaluation, _____________. TSH was found to be low at 0.03, T3 and T4 were normal. Endocrine was consulted and levothyroxine was decreased to 75mcg daily. FOr HTN, amlodipine and HCTZ were continued. For HLD, statin was continued.    PT evaluated patient, recommended ____________.       ---  CONSULTANTS:   Neurology - Dr. Kraus  Palliative Care - Dr. Gurpreet Adam    ---  TIME SPENT:  I, the attending physician, was physically present for the key portions of the evaluation and management (E/M) service provided. The total amount of time spent reviewing the hospital notes, laboratory values, imaging findings, assessing/counseling the patient, discussing with consultant physicians, social work, nursing staff was -- minutes    ---  Primary care provider was made aware of plan for discharge:      [  ] NO     [  ] YES   FROM ADMISSION H+P:   HPI:  88 yo female with PMHx of dementia, HTN, HLD, hypothyroidism, hyperthyroidism, brain aneurysm (1979), presenting to ER by family for gradual worsening of confusion and inability to perform ADL. This has been worsening for months, however symptoms first start 2 years ago. No acute deterioration. No recent illnesses per  and Daughter who are at bedside. No recent alteration in medications. No recent falls. Family reporting strange eating habits, binge eating followed by vomiting as well as refusing to eat foods pt previously liked. Family reporting difficulty with eating, swallowing and at times choking. Patient unable to climb stairs in home, can't dress herself or us toilet alone. Had an episode of wandering 8 months ago. Pt's  is sole caretaker at home, however also at an advanced age and no longer able to provide high level of care. Family requesting long term nursing facility placement.  is HCP with daughter Rosa Elena [phone]. As per family, patient has living will stating DNR/DNI made before patient had dementia.     ED Course: No treatments in ER  Vitals T(F): 97.8, HR: 69, BP: 148/78, RR: 18, SpO2: 94% on room air   Labs significant for TSH 0.03   Imaging: CT head : Status post right frontal temporal craniotomy for surgical clipping in the right parasellar region. Metallic artifact limits evaluation of the surrounding parenchyma. Encephalomalacia on the right frontal lobe underlying the craniotomy is reidentified. Findings are unchanged since prior exam. The ventricles and sulci are prominent, compatible with age-related generalized cerebral volume loss.   There is no CT evidence for acute cerebral cortical infarct. There is no evidence of hemorrhage. Small chronic lacunar infarcts in the left basal ganglia are present. There is periventricular and subcortical white matter hypoattenuation,  most compatible with chronic microvascular ischemic changes.   No mass effect is found in the brain.  There is no midline shift or herniation pattern.   (12 Jun 2021 15:22)      ---  HOSPITAL COURSE:   Patient was admitted to the hospital for neurology evaluation and for placement. Placed on fall and aspiration precautions. Neurology consulted. B12 _____, folate _____, ammonia level _____. Buspar was continued. Nutrition recommended adding Ensure Enlive BID to meals. Per speech and swallow evaluation, _____________. TSH was found to be low at 0.03, T3 and T4 were normal. Endocrine was consulted and levothyroxine was decreased to 75mcg daily. FOr HTN, amlodipine and HCTZ were continued. For HLD, statin was continued.    PT evaluated patient, recommended [facility] vs LTC.       ---  CONSULTANTS:   Neurology - Dr. Kraus  Palliative Care - Dr. Gurpreet Adam    ---  TIME SPENT:  I, the attending physician, was physically present for the key portions of the evaluation and management (E/M) service provided. The total amount of time spent reviewing the hospital notes, laboratory values, imaging findings, assessing/counseling the patient, discussing with consultant physicians, social work, nursing staff was -- minutes    ---  Primary care provider was made aware of plan for discharge:      [  ] NO     [  ] YES   FROM ADMISSION H+P:   HPI:  89 yo female with PMHx of dementia, HTN, HLD, hypothyroidism, hyperthyroidism, brain aneurysm (1979), presenting to ER by family for gradual worsening of confusion and inability to perform ADL. This has been worsening for months, however symptoms first start 2 years ago. No acute deterioration. No recent illnesses per  and Daughter who are at bedside. No recent alteration in medications. No recent falls. Family reporting strange eating habits, binge eating followed by vomiting as well as refusing to eat foods pt previously liked. Family reporting difficulty with eating, swallowing and at times choking. Patient unable to climb stairs in home, can't dress herself or us toilet alone. Had an episode of wandering 8 months ago. Pt's  is sole caretaker at home, however also at an advanced age and no longer able to provide high level of care. Family requesting long term nursing facility placement.  is HCP with daughter Rosa Elena [phone]. As per family, patient has living will stating DNR/DNI made before patient had dementia.     ED Course: No treatments in ER  Vitals T(F): 97.8, HR: 69, BP: 148/78, RR: 18, SpO2: 94% on room air   Labs significant for TSH 0.03   Imaging: CT head : Status post right frontal temporal craniotomy for surgical clipping in the right parasellar region. Metallic artifact limits evaluation of the surrounding parenchyma. Encephalomalacia on the right frontal lobe underlying the craniotomy is reidentified. Findings are unchanged since prior exam. The ventricles and sulci are prominent, compatible with age-related generalized cerebral volume loss.   There is no CT evidence for acute cerebral cortical infarct. There is no evidence of hemorrhage. Small chronic lacunar infarcts in the left basal ganglia are present. There is periventricular and subcortical white matter hypoattenuation,  most compatible with chronic microvascular ischemic changes.   No mass effect is found in the brain.  There is no midline shift or herniation pattern.   (12 Jun 2021 15:22)      ---  HOSPITAL COURSE:   Patient was admitted to the hospital for neurology evaluation and for placement. Placed on fall and aspiration precautions. Neurology consulted. B12, folate, and ammonia all within normal limits. Symptoms likely 2/2 progressive dementia. Buspar was continued. Nutrition recommended adding Ensure Enlive BID to meals. Per speech and swallow evaluation, patient recommended dysphagia 1 diet purees with nectar thick liquids on teaspoon, and aspiration precautions. TSH was found to be low at 0.03, T3 and T4 were normal. Endocrine was consulted and levothyroxine was decreased to 75mcg daily. FOr HTN, amlodipine and HCTZ were continued. For HLD, statin was continued.    PT evaluated patient, recommended [facility] vs [facility].       ---  CONSULTANTS:   Neurology - Dr. Kraus  Palliative Care - Dr. Gurpreet Adam    ---  TIME SPENT:  I, the attending physician, was physically present for the key portions of the evaluation and management (E/M) service provided. The total amount of time spent reviewing the hospital notes, laboratory values, imaging findings, assessing/counseling the patient, discussing with consultant physicians, social work, nursing staff was -- minutes    ---  Primary care provider was made aware of plan for discharge:      [  ] NO     [  ] YES   FROM ADMISSION H+P:   HPI:  88 yo female with PMHx of dementia, HTN, HLD, hypothyroidism, hyperthyroidism, brain aneurysm (1979), presenting to ER by family for gradual worsening of confusion and inability to perform ADL. This has been worsening for months, however symptoms first start 2 years ago. No acute deterioration. No recent illnesses per  and Daughter who are at bedside. No recent alteration in medications. No recent falls. Family reporting strange eating habits, binge eating followed by vomiting as well as refusing to eat foods pt previously liked. Family reporting difficulty with eating, swallowing and at times choking. Patient unable to climb stairs in home, can't dress herself or us toilet alone. Had an episode of wandering 8 months ago. Pt's  is sole caretaker at home, however also at an advanced age and no longer able to provide high level of care. Family requesting long term nursing facility placement.  is HCP with daughter Rosa Elena [phone]. As per family, patient has living will stating DNR/DNI made before patient had dementia.     ED Course: No treatments in ER  Vitals T(F): 97.8, HR: 69, BP: 148/78, RR: 18, SpO2: 94% on room air   Labs significant for TSH 0.03   Imaging: CT head : Status post right frontal temporal craniotomy for surgical clipping in the right parasellar region. Metallic artifact limits evaluation of the surrounding parenchyma. Encephalomalacia on the right frontal lobe underlying the craniotomy is reidentified. Findings are unchanged since prior exam. The ventricles and sulci are prominent, compatible with age-related generalized cerebral volume loss.   There is no CT evidence for acute cerebral cortical infarct. There is no evidence of hemorrhage. Small chronic lacunar infarcts in the left basal ganglia are present. There is periventricular and subcortical white matter hypoattenuation,  most compatible with chronic microvascular ischemic changes.   No mass effect is found in the brain.  There is no midline shift or herniation pattern.   (12 Jun 2021 15:22)      ---  HOSPITAL COURSE:   Patient was admitted to the hospital for neurology evaluation and for placement. Placed on fall and aspiration precautions. Neurology consulted. B12, folate, and ammonia all within normal limits. Symptoms likely 2/2 progressive dementia. Buspar was continued. Nutrition recommended adding Ensure Enlive BID to meals. Per speech and swallow evaluation, patient recommended dysphagia 1 diet purees with nectar thick liquids on teaspoon, and aspiration precautions. TSH was found to be low at 0.03, T3 and T4 were normal. Endocrine was consulted and levothyroxine was decreased to 75mcg daily. FOr HTN, amlodipine and HCTZ were continued. For HLD, statin was continued.    PT evaluated patient, recommended [facility], with eventual transition to LTC.    Patient seen and examined on day of discharge. Determined to be medically stable for discharge to ________.    T(C): 36.6 (06-16-21 @ 05:10), Max: 36.9 (06-15-21 @ 13:04)  HR: 83 (06-16-21 @ 05:10) (83 - 103)  BP: 101/60 (06-16-21 @ 05:10) (97/73 - 124/71)  RR: 18 (06-16-21 @ 05:10) (18 - 19)  SpO2: 96% (06-16-21 @ 05:10) (94% - 96%)      PHYSICAL EXAM:  GENERAL: NAD  HEENT:  anicteric, moist mucous membranes  CHEST/LUNG:  CTA b/l, no rales, wheezes, or rhonchi  HEART:  RRR, S1, S2  ABDOMEN:  BS+, soft, nontender, nondistended  EXTREMITIES: no edema, cyanosis, or calf tenderness  NERVOUS SYSTEM: A&Ox1 (knows first name, does not know last name)         ---  CONSULTANTS:   Neurology - Dr. Kraus  Palliative Care - Dr. Gurpreet Adam    ---  TIME SPENT:  I, the attending physician, was physically present for the key portions of the evaluation and management (E/M) service provided. The total amount of time spent reviewing the hospital notes, laboratory values, imaging findings, assessing/counseling the patient, discussing with consultant physicians, social work, nursing staff was -- minutes    ---  Primary care provider was made aware of plan for discharge:      [  ] NO     [  ] YES   FROM ADMISSION H+P:   HPI:  89 yo female with PMHx of dementia, HTN, HLD, hypothyroidism, hyperthyroidism, brain aneurysm (1979), presenting to ER by family for gradual worsening of confusion and inability to perform ADL. This has been worsening for months, however symptoms first start 2 years ago. No acute deterioration. No recent illnesses per  and Daughter who are at bedside. No recent alteration in medications. No recent falls. Family reporting strange eating habits, binge eating followed by vomiting as well as refusing to eat foods pt previously liked. Family reporting difficulty with eating, swallowing and at times choking. Patient unable to climb stairs in home, can't dress herself or us toilet alone. Had an episode of wandering 8 months ago. Pt's  is sole caretaker at home, however also at an advanced age and no longer able to provide high level of care. Family requesting long term nursing facility placement.  is HCP with daughter Rosa Elena [phone]. As per family, patient has living will stating DNR/DNI made before patient had dementia.     ED Course: No treatments in ER  Vitals T(F): 97.8, HR: 69, BP: 148/78, RR: 18, SpO2: 94% on room air   Labs significant for TSH 0.03   Imaging: CT head : Status post right frontal temporal craniotomy for surgical clipping in the right parasellar region. Metallic artifact limits evaluation of the surrounding parenchyma. Encephalomalacia on the right frontal lobe underlying the craniotomy is reidentified. Findings are unchanged since prior exam. The ventricles and sulci are prominent, compatible with age-related generalized cerebral volume loss.   There is no CT evidence for acute cerebral cortical infarct. There is no evidence of hemorrhage. Small chronic lacunar infarcts in the left basal ganglia are present. There is periventricular and subcortical white matter hypoattenuation,  most compatible with chronic microvascular ischemic changes.   No mass effect is found in the brain.  There is no midline shift or herniation pattern.   (12 Jun 2021 15:22)      ---  HOSPITAL COURSE:   Patient was admitted to the hospital for neurology evaluation and for placement. Placed on fall and aspiration precautions. Neurology consulted. B12, folate, and ammonia all within normal limits. Symptoms likely 2/2 progressive dementia. Buspar was continued. Nutrition recommended adding Ensure Enlive BID to meals. Per speech and swallow evaluation, patient recommended dysphagia 1 diet purees with nectar thick liquids on teaspoon, and aspiration precautions. TSH was found to be low at 0.03, T3 and T4 were normal. Endocrine was consulted and levothyroxine was decreased to 75mcg daily. FOr HTN, amlodipine and HCTZ were continued. For HLD, statin was continued.    PT evaluated patient, recommended [facility], with eventual transition to LTC.    Patient seen and examined on day of discharge. Determined to be medically stable for discharge to [facility].    T(C): 36.6 (06-16-21 @ 05:10), Max: 36.9 (06-15-21 @ 13:04)  HR: 83 (06-16-21 @ 05:10) (83 - 103)  BP: 101/60 (06-16-21 @ 05:10) (97/73 - 124/71)  RR: 18 (06-16-21 @ 05:10) (18 - 19)  SpO2: 96% (06-16-21 @ 05:10) (94% - 96%)      PHYSICAL EXAM:  GENERAL: NAD  HEENT:  anicteric, moist mucous membranes  CHEST/LUNG:  CTA b/l, no rales, wheezes, or rhonchi  HEART:  RRR, S1, S2  ABDOMEN:  BS+, soft, nontender, nondistended  EXTREMITIES: no edema, cyanosis, or calf tenderness  NERVOUS SYSTEM: A&Ox1 (knows first name, does not know last name)         ---  CONSULTANTS:   Neurology - Dr. Kraus  Palliative Care - Dr. Gurpreet Adam    ---  TIME SPENT:  I, the attending physician, was physically present for the key portions of the evaluation and management (E/M) service provided. The total amount of time spent reviewing the hospital notes, laboratory values, imaging findings, assessing/counseling the patient, discussing with consultant physicians, social work, nursing staff was -- minutes    ---  Primary care provider was made aware of plan for discharge:      [  ] NO     [  ] YES   FROM ADMISSION H+P:   HPI:  89 yo female with PMHx of dementia, HTN, HLD, hypothyroidism, hyperthyroidism, brain aneurysm (1979), presenting to ER by family for gradual worsening of confusion and inability to perform ADL. This has been worsening for months, however symptoms first start 2 years ago. No acute deterioration. No recent illnesses per  and Daughter who are at bedside. No recent alteration in medications. No recent falls. Family reporting strange eating habits, binge eating followed by vomiting as well as refusing to eat foods pt previously liked. Family reporting difficulty with eating, swallowing and at times choking. Patient unable to climb stairs in home, can't dress herself or us toilet alone. Had an episode of wandering 8 months ago. Pt's  is sole caretaker at home, however also at an advanced age and no longer able to provide high level of care. Family requesting long term nursing facility placement.  is HCP with daughter Rosa Elena [phone]. As per family, patient has living will stating DNR/DNI made before patient had dementia.     ED Course: No treatments in ER  Vitals T(F): 97.8, HR: 69, BP: 148/78, RR: 18, SpO2: 94% on room air   Labs significant for TSH 0.03   Imaging: CT head : Status post right frontal temporal craniotomy for surgical clipping in the right parasellar region. Metallic artifact limits evaluation of the surrounding parenchyma. Encephalomalacia on the right frontal lobe underlying the craniotomy is reidentified. Findings are unchanged since prior exam. The ventricles and sulci are prominent, compatible with age-related generalized cerebral volume loss.   There is no CT evidence for acute cerebral cortical infarct. There is no evidence of hemorrhage. Small chronic lacunar infarcts in the left basal ganglia are present. There is periventricular and subcortical white matter hypoattenuation,  most compatible with chronic microvascular ischemic changes.   No mass effect is found in the brain.  There is no midline shift or herniation pattern.   (12 Jun 2021 15:22)      ---  HOSPITAL COURSE:   Patient was admitted to the hospital for neurology evaluation and for placement. Placed on fall and aspiration precautions. Neurology consulted. B12, folate, and ammonia all within normal limits. Symptoms likely 2/2 progressive dementia. Buspar was continued. Risperdal 0.25mg given PRN for agitation. Nutrition recommended adding Ensure Enlive BID to meals. Per speech and swallow evaluation, patient recommended dysphagia 1 diet purees with nectar thick liquids on teaspoon, and aspiration precautions. TSH was found to be low at 0.03, T3 and T4 were normal. Endocrine was consulted and levothyroxine was decreased to 75mcg daily. For HTN, amlodipine and HCTZ were continued. For HLD, statin was continued.    PT evaluated patient, recommended [facility], with eventual transition to LTC.    Patient seen and examined on day of discharge. Determined to be medically stable for discharge to [facility].    Vital Signs Last 24 Hrs  T(C): 36.7 (17 Jun 2021 05:08), Max: 36.9 (16 Jun 2021 20:40)  T(F): 98.1 (17 Jun 2021 05:08), Max: 98.5 (16 Jun 2021 20:40)  HR: 87 (17 Jun 2021 05:08) (78 - 87)  BP: 107/58 (17 Jun 2021 05:08) (107/58 - 127/74)  BP(mean): --  RR: 18 (17 Jun 2021 05:08) (17 - 19)  SpO2: 94% (17 Jun 2021 05:08) (92% - 94%)      PHYSICAL EXAM:  GENERAL: NAD  HEENT:  anicteric, moist mucous membranes  CHEST/LUNG:  CTA b/l, no rales, wheezes, or rhonchi  HEART:  RRR, S1, S2  ABDOMEN:  BS+, soft, nontender, nondistended  EXTREMITIES: no edema, cyanosis, or calf tenderness  NERVOUS SYSTEM: A&Ox1 (knows first name, does not know last name)         ---  CONSULTANTS:   Neurology - Dr. Kraus  Palliative Care - Dr. Gurpreet Adam    ---  TIME SPENT:  I, the attending physician, was physically present for the key portions of the evaluation and management (E/M) service provided. The total amount of time spent reviewing the hospital notes, laboratory values, imaging findings, assessing/counseling the patient, discussing with consultant physicians, social work, nursing staff was -- minutes    ---  Primary care provider was made aware of plan for discharge:      [  ] NO     [  ] YES   FROM ADMISSION H+P:   HPI:  88 yo female with PMHx of dementia, HTN, HLD, hypothyroidism, hyperthyroidism, brain aneurysm (1979), presenting to ER by family for gradual worsening of confusion and inability to perform ADL. This has been worsening for months, however symptoms first start 2 years ago. No acute deterioration. No recent illnesses per  and Daughter who are at bedside. No recent alteration in medications. No recent falls. Family reporting strange eating habits, binge eating followed by vomiting as well as refusing to eat foods pt previously liked. Family reporting difficulty with eating, swallowing and at times choking. Patient unable to climb stairs in home, can't dress herself or us toilet alone. Had an episode of wandering 8 months ago. Pt's  is sole caretaker at home, however also at an advanced age and no longer able to provide high level of care. Family requesting long term nursing facility placement.  is HCP with daughter Rosa Elena [phone]. As per family, patient has living will stating DNR/DNI made before patient had dementia.     ED Course: No treatments in ER  Vitals T(F): 97.8, HR: 69, BP: 148/78, RR: 18, SpO2: 94% on room air   Labs significant for TSH 0.03   Imaging: CT head : Status post right frontal temporal craniotomy for surgical clipping in the right parasellar region. Metallic artifact limits evaluation of the surrounding parenchyma. Encephalomalacia on the right frontal lobe underlying the craniotomy is reidentified. Findings are unchanged since prior exam. The ventricles and sulci are prominent, compatible with age-related generalized cerebral volume loss.   There is no CT evidence for acute cerebral cortical infarct. There is no evidence of hemorrhage. Small chronic lacunar infarcts in the left basal ganglia are present. There is periventricular and subcortical white matter hypoattenuation,  most compatible with chronic microvascular ischemic changes.   No mass effect is found in the brain.  There is no midline shift or herniation pattern.   (12 Jun 2021 15:22)      ---  HOSPITAL COURSE:   Patient was admitted to the hospital for neurology evaluation and for placement. Placed on fall and aspiration precautions. Neurology consulted. B12, folate, and ammonia all within normal limits. Symptoms likely 2/2 progressive dementia. Buspar was continued. Risperdal 0.25mg given PRN for agitation. Nutrition recommended adding Ensure Enlive BID to meals. Per speech and swallow evaluation, patient recommended dysphagia 1 diet purees with nectar thick liquids on teaspoon, and aspiration precautions. TSH was found to be low at 0.03, T3 and T4 were normal. Endocrine was consulted and levothyroxine was decreased to 75mcg daily. For HTN, amlodipine and HCTZ were continued. For HLD, statin was continued.    PT evaluated patient, recommended [facility], with eventual transition to LTC.    Patient seen and examined on day of discharge. Determined to be medically stable for discharge to [facility].    Vital Signs Last 24 Hrs  T(C): 36.5 (18 Jun 2021 05:07), Max: 36.9 (17 Jun 2021 20:26)  T(F): 97.7 (18 Jun 2021 05:07), Max: 98.4 (17 Jun 2021 20:26)  HR: 67 (18 Jun 2021 05:07) (65 - 92)  BP: 149/64 (18 Jun 2021 05:07) (88/65 - 152/76)  BP(mean): --  RR: 18 (18 Jun 2021 05:07) (18 - 19)  SpO2: 93% (18 Jun 2021 05:07) (93% - 95%)      PHYSICAL EXAM:  GENERAL: NAD  HEENT:  anicteric, moist mucous membranes  CHEST/LUNG:  CTA b/l, no rales, wheezes, or rhonchi  HEART:  RRR, S1, S2  ABDOMEN:  BS+, soft, nontender, nondistended  EXTREMITIES: no edema, cyanosis, or calf tenderness  NERVOUS SYSTEM: A&Ox1 (knows first name, does not know last name)         ---  CONSULTANTS:   Neurology - Dr. Kraus  Palliative Care - Dr. Gurpreet Adam    ---  TIME SPENT:  I, the attending physician, was physically present for the key portions of the evaluation and management (E/M) service provided. The total amount of time spent reviewing the hospital notes, laboratory values, imaging findings, assessing/counseling the patient, discussing with consultant physicians, social work, nursing staff was -- minutes    ---  Primary care provider was made aware of plan for discharge:      [  ] NO     [  ] YES   FROM ADMISSION H+P:   HPI:  88 yo female with PMHx of dementia, HTN, HLD, hypothyroidism, hyperthyroidism, brain aneurysm (1979), presenting to ER by family for gradual worsening of confusion and inability to perform ADL. This has been worsening for months, however symptoms first start 2 years ago. No acute deterioration. No recent illnesses per  and Daughter who are at bedside. No recent alteration in medications. No recent falls. Family reporting strange eating habits, binge eating followed by vomiting as well as refusing to eat foods pt previously liked. Family reporting difficulty with eating, swallowing and at times choking. Patient unable to climb stairs in home, can't dress herself or us toilet alone. Had an episode of wandering 8 months ago. Pt's  is sole caretaker at home, however also at an advanced age and no longer able to provide high level of care. Family requesting long term nursing facility placement.  is HCP with daughter Rosa Elena [phone]. As per family, patient has living will stating DNR/DNI made before patient had dementia.     ED Course: No treatments in ER  Vitals T(F): 97.8, HR: 69, BP: 148/78, RR: 18, SpO2: 94% on room air   Labs significant for TSH 0.03   Imaging: CT head : Status post right frontal temporal craniotomy for surgical clipping in the right parasellar region. Metallic artifact limits evaluation of the surrounding parenchyma. Encephalomalacia on the right frontal lobe underlying the craniotomy is reidentified. Findings are unchanged since prior exam. The ventricles and sulci are prominent, compatible with age-related generalized cerebral volume loss.   There is no CT evidence for acute cerebral cortical infarct. There is no evidence of hemorrhage. Small chronic lacunar infarcts in the left basal ganglia are present. There is periventricular and subcortical white matter hypoattenuation,  most compatible with chronic microvascular ischemic changes.   No mass effect is found in the brain.  There is no midline shift or herniation pattern.   (12 Jun 2021 15:22)      ---  HOSPITAL COURSE:   Patient was admitted to the hospital for neurology evaluation and for placement. Placed on fall and aspiration precautions. Neurology consulted. B12, folate, and ammonia all within normal limits. Symptoms likely 2/2 progressive dementia. Buspar was continued. Risperdal 0.25mg given PRN for agitation. Nutrition recommended adding Ensure Enlive BID to meals. Per speech and swallow evaluation, patient recommended dysphagia 1 diet purees with nectar thick liquids on teaspoon, and aspiration precautions. TSH was found to be low at 0.03, T3 and T4 were normal. Endocrine was consulted and levothyroxine was decreased to 75mcg daily. For HTN, amlodipine and HCTZ were continued. For HLD, statin was continued. PT evaluated patient, recommended [facility], with eventual transition to LTC. Patient seen and examined on day of discharge. Determined to be medically stable for discharge to [facility].    Vital Signs Last 24 Hrs  T(C): 36.5 (18 Jun 2021 05:07), Max: 36.9 (17 Jun 2021 20:26)  T(F): 97.7 (18 Jun 2021 05:07), Max: 98.4 (17 Jun 2021 20:26)  HR: 67 (18 Jun 2021 05:07) (65 - 92)  BP: 149/64 (18 Jun 2021 05:07) (88/65 - 152/76)  BP(mean): --  RR: 18 (18 Jun 2021 05:07) (18 - 19)  SpO2: 93% (18 Jun 2021 05:07) (93% - 95%)      PHYSICAL EXAM:  GENERAL: NAD  HEENT:  anicteric, moist mucous membranes  CHEST/LUNG:  CTA b/l, no rales, wheezes, or rhonchi  HEART:  RRR, S1, S2  ABDOMEN:  BS+, soft, nontender, nondistended  EXTREMITIES: no edema, cyanosis, or calf tenderness  NERVOUS SYSTEM: A&Ox1 (knows first name, does not know last name)         ---  CONSULTANTS:   Neurology - Dr. Kraus  Palliative Care - Dr. Gurpreet Adam    ---  TIME SPENT: 65 minutes  I, the attending physician, was physically present for the key portions of the evaluation and management (E/M) service provided. The total amount of time spent reviewing the hospital notes, laboratory values, imaging findings, assessing/counseling the patient, discussing with consultant physicians, social work, nursing staff was 65 minutes

## 2021-06-13 NOTE — DIETITIAN INITIAL EVALUATION ADULT. - ORAL INTAKE PTA/DIET HISTORY
Per pt's daughter pt normally with good appetite and PO intake, however over the past week appetite has become extremely poor. Pt has become stubborn will like a certain food one day and then dislike it the next day. Was consuming soft foods PTA as she has trouble chewing "bulky" foods then causing her choke. Noted last week that the pt binge ate 1lb of potato salad and a box of cookies then causing her to vomit and have diarrhea. NKFA.

## 2021-06-14 LAB
ALBUMIN SERPL ELPH-MCNC: 3.1 G/DL — LOW (ref 3.3–5)
ALP SERPL-CCNC: 85 U/L — SIGNIFICANT CHANGE UP (ref 40–120)
ALT FLD-CCNC: 13 U/L — SIGNIFICANT CHANGE UP (ref 12–78)
AMMONIA BLD-MCNC: <17 UMOL/L — SIGNIFICANT CHANGE UP (ref 11–32)
ANION GAP SERPL CALC-SCNC: 9 MMOL/L — SIGNIFICANT CHANGE UP (ref 5–17)
AST SERPL-CCNC: 22 U/L — SIGNIFICANT CHANGE UP (ref 15–37)
BASOPHILS # BLD AUTO: 0.03 K/UL — SIGNIFICANT CHANGE UP (ref 0–0.2)
BASOPHILS NFR BLD AUTO: 0.4 % — SIGNIFICANT CHANGE UP (ref 0–2)
BILIRUB SERPL-MCNC: 0.3 MG/DL — SIGNIFICANT CHANGE UP (ref 0.2–1.2)
BUN SERPL-MCNC: 20 MG/DL — SIGNIFICANT CHANGE UP (ref 7–23)
CALCIUM SERPL-MCNC: 9.4 MG/DL — SIGNIFICANT CHANGE UP (ref 8.5–10.1)
CHLORIDE SERPL-SCNC: 108 MMOL/L — SIGNIFICANT CHANGE UP (ref 96–108)
CO2 SERPL-SCNC: 24 MMOL/L — SIGNIFICANT CHANGE UP (ref 22–31)
CREAT SERPL-MCNC: 0.59 MG/DL — SIGNIFICANT CHANGE UP (ref 0.5–1.3)
EOSINOPHIL # BLD AUTO: 0.08 K/UL — SIGNIFICANT CHANGE UP (ref 0–0.5)
EOSINOPHIL NFR BLD AUTO: 1.1 % — SIGNIFICANT CHANGE UP (ref 0–6)
FOLATE SERPL-MCNC: 13.6 NG/ML — SIGNIFICANT CHANGE UP
GLUCOSE SERPL-MCNC: 103 MG/DL — HIGH (ref 70–99)
HCT VFR BLD CALC: 42 % — SIGNIFICANT CHANGE UP (ref 34.5–45)
HGB BLD-MCNC: 14.1 G/DL — SIGNIFICANT CHANGE UP (ref 11.5–15.5)
IMM GRANULOCYTES NFR BLD AUTO: 0.1 % — SIGNIFICANT CHANGE UP (ref 0–1.5)
LYMPHOCYTES # BLD AUTO: 1.34 K/UL — SIGNIFICANT CHANGE UP (ref 1–3.3)
LYMPHOCYTES # BLD AUTO: 17.6 % — SIGNIFICANT CHANGE UP (ref 13–44)
MAGNESIUM SERPL-MCNC: 1.5 MG/DL — LOW (ref 1.6–2.6)
MCHC RBC-ENTMCNC: 28.5 PG — SIGNIFICANT CHANGE UP (ref 27–34)
MCHC RBC-ENTMCNC: 33.6 GM/DL — SIGNIFICANT CHANGE UP (ref 32–36)
MCV RBC AUTO: 85 FL — SIGNIFICANT CHANGE UP (ref 80–100)
MONOCYTES # BLD AUTO: 0.68 K/UL — SIGNIFICANT CHANGE UP (ref 0–0.9)
MONOCYTES NFR BLD AUTO: 8.9 % — SIGNIFICANT CHANGE UP (ref 2–14)
NEUTROPHILS # BLD AUTO: 5.47 K/UL — SIGNIFICANT CHANGE UP (ref 1.8–7.4)
NEUTROPHILS NFR BLD AUTO: 71.9 % — SIGNIFICANT CHANGE UP (ref 43–77)
NRBC # BLD: 0 /100 WBCS — SIGNIFICANT CHANGE UP (ref 0–0)
PHOSPHATE SERPL-MCNC: 2.7 MG/DL — SIGNIFICANT CHANGE UP (ref 2.5–4.5)
PLATELET # BLD AUTO: 232 K/UL — SIGNIFICANT CHANGE UP (ref 150–400)
POTASSIUM SERPL-MCNC: 3.9 MMOL/L — SIGNIFICANT CHANGE UP (ref 3.5–5.3)
POTASSIUM SERPL-SCNC: 3.9 MMOL/L — SIGNIFICANT CHANGE UP (ref 3.5–5.3)
PROT SERPL-MCNC: 6.9 G/DL — SIGNIFICANT CHANGE UP (ref 6–8.3)
RBC # BLD: 4.94 M/UL — SIGNIFICANT CHANGE UP (ref 3.8–5.2)
RBC # FLD: 13.9 % — SIGNIFICANT CHANGE UP (ref 10.3–14.5)
SODIUM SERPL-SCNC: 141 MMOL/L — SIGNIFICANT CHANGE UP (ref 135–145)
VIT B12 SERPL-MCNC: 516 PG/ML — SIGNIFICANT CHANGE UP (ref 232–1245)
WBC # BLD: 7.61 K/UL — SIGNIFICANT CHANGE UP (ref 3.8–10.5)
WBC # FLD AUTO: 7.61 K/UL — SIGNIFICANT CHANGE UP (ref 3.8–10.5)

## 2021-06-14 PROCEDURE — 99232 SBSQ HOSP IP/OBS MODERATE 35: CPT | Mod: GC

## 2021-06-14 RX ORDER — MAGNESIUM SULFATE 500 MG/ML
2 VIAL (ML) INJECTION ONCE
Refills: 0 | Status: COMPLETED | OUTPATIENT
Start: 2021-06-14 | End: 2021-06-14

## 2021-06-14 RX ADMIN — Medication 50 GRAM(S): at 08:43

## 2021-06-14 RX ADMIN — AMLODIPINE BESYLATE 2.5 MILLIGRAM(S): 2.5 TABLET ORAL at 06:34

## 2021-06-14 RX ADMIN — Medication 25 MILLIGRAM(S): at 06:34

## 2021-06-14 RX ADMIN — Medication 40 MILLIGRAM(S): at 21:52

## 2021-06-14 RX ADMIN — Medication 5 MILLIGRAM(S): at 06:34

## 2021-06-14 RX ADMIN — Medication 75 MICROGRAM(S): at 06:34

## 2021-06-14 NOTE — PROGRESS NOTE ADULT - PROBLEM SELECTOR PLAN 1
No official dx given by PMD however patient showing stereotypical signs for >2years. Patient deconditioned, not able to perform any ADLS. Family unable to care for pt at home. Family requesting long term facility.  - CT head: Status post right frontal temporal craniotomy for surgical clipping in the right parasellar region. Metallic artifact limits evaluation of the surrounding parenchyma. Encephalomalacia on the right frontal lobe underlying the craniotomy is reidentified. Findings are unchanged since prior exam. The ventricles and sulci are prominent, compatible with age-related generalized cerebral volume loss. There is no CT evidence for acute cerebral cortical infarct. There is no evidence of hemorrhage. Small chronic lacunar infarcts in the left basal ganglia are present. There is periventricular and subcortical white matter hypoattenuation,  most compatible with chronic microvascular ischemic changes.  No mass effect is found in the brain. There is no midline shift or herniation pattern.  - Enhanced supervision for now, can start low dose Risperdal or Zyprexa if needed for agitation  - Fall and aspiration precautions  - B12, folate, ammonia level WNL  - Continue home buspar 5mg qd   - Neuro Dr. Kraus following--likely progressive dementia  - PT consulted--recommend [facility]  - Nutrition consulted--recommend Ensure Enlive BID  - SW consulted  - Palliative Dr Garcia consulted for GOC, molst completion and advance care planning

## 2021-06-14 NOTE — SWALLOW BEDSIDE ASSESSMENT ADULT - SWALLOW EVAL: RECOMMENDED FEEDING/EATING TECHNIQUES
ALL LIQUIDS VIA TEASPOON/allow for swallow between intakes/alternate food with liquid/check mouth frequently for oral residue/pocketing/crush medication (when feasible)/no straws/oral hygiene/position upright (90 degrees)/small sips/bites

## 2021-06-14 NOTE — GOALS OF CARE CONVERSATION - ADVANCED CARE PLANNING - CONVERSATION DETAILS
Writer met Juan Manuel Reviewed patient's medical and social history as well as events leading to patient's hospitalization. Writer discussed patient's current diagnosis dementia FTT dysphagia, debility , impaired mental status ,recent fall, advanced age.. medical condition and management, prognosis, and limited life expectancy. Inquired about patient's wishes regarding extent of medical care to be provided including escalation of medical care into the ICU and use of vasopressor support, intubation/ventilatory support  In addition, the writer inquired about thoughts regarding cardiopulmonary resuscitation, artificial nutrition and hydration including use of feeding tubes and IVF, antibiotics, and further investigative studies such as blood draws and radiology. Daughter and  showed good. insight into medical condition. All questions answered. Writer recommended completion of [facility] Patient HCP t consented to DNR/DNI status. [facility] form filled out and placed in chart. Writer also educated family about home/residential hospice if pt condition does not improve at [facility]. Explained benefit and purpose of hospice to help pt remain comfortable at home.

## 2021-06-14 NOTE — SWALLOW BEDSIDE ASSESSMENT ADULT - ASR SWALLOW ASPIRATION MONITOR
change of breathing pattern/oral hygiene/position upright (90Y)/cough/gurgly voice/fever/pneumonia/upper respiratory infection

## 2021-06-14 NOTE — SWALLOW BEDSIDE ASSESSMENT ADULT - SLP PERTINENT HISTORY OF CURRENT PROBLEM
Per charting, "89 yo female with PMHx of dementia, HTN, HLD, hypothyroidism, hyperthyroidism, brain aneurysm (1979), presenting to ER by family for gradual worsening of confusion and inability to perform ADLs. Patient admitted for placement and neurology eval."

## 2021-06-14 NOTE — SWALLOW BEDSIDE ASSESSMENT ADULT - PHARYNGEAL PHASE
Delayed pharyngeal swallow/Throat clear post oral intake/Delayed cough post oral intake Delayed pharyngeal swallow

## 2021-06-14 NOTE — SWALLOW BEDSIDE ASSESSMENT ADULT - COMMENTS
Pt received upright in bed, awake and cooperative, on room air. Pt was agreeable to assessment. Pt demonstrated difficulty consistently following low level directives. Pt's vocal quality/intensity was WFL. Pt denied pain pre and post assessment.  Per dietary consult, "Was consuming soft foods PTA as she has trouble chewing "bulky" foods then causing her choke."    CT head 6/12: "No evidence of acute intracranial abnormality. No evidence of hemorrhage."  No CXR has been administered this admission. Pt's WBC is WFL, no fever.

## 2021-06-14 NOTE — SWALLOW BEDSIDE ASSESSMENT ADULT - SWALLOW EVAL: PROGNOSIS
DIAGNOSIS CONTINUED: assistance during all PO intake. Feed only when fully awake/attentive to PO. Administer all PO via teaspoon, alternate bite/sip, pace meal slowly. Discussed with MD.

## 2021-06-14 NOTE — SWALLOW BEDSIDE ASSESSMENT ADULT - SWALLOW EVAL: DIAGNOSIS
Pt p/w a moderate oral dysphagia when given small bolus volume of regular solid marked by adequate retrieval and containment, prolonged mastication/manipulation, bolus hold, delayed transfer, and trace residue on lateral lingual surface post swallow. Mild oral dysphagia when given thin liquids, nectar thick liquids, honey thick liquids, and puree marked by adequate retrieval and containment, timely manipulation and transfer, suspected posterior loss, and adequate clearance post swallow. Pharyngeal dysphagia marked by suspected delayed swallow onset, +laryngeal elevation to palpation. Pt with immediate throat clear post swallow followed by delayed cough response when given thin liquids. Pt did not demonstrate overt s/sx of aspiration when given nectar thick, honey thick, puree, and regular solids. Recommend diet downgrade to puree with nectar thick liquids via TEASPOON to ensure small bolus volume and assist with lingual control. Aspiration precautions. Provide 1:1 supervision/

## 2021-06-14 NOTE — PROGRESS NOTE ADULT - PROBLEM SELECTOR PLAN 2
As per family, pt with hx of choking and difficulty swallowing  - Dysphagia 3 soft solids thin liquids diet per bedside swallow assessment  - F/u official S&S eval  - Aspiration precautions  - Nutrition consulted for malnourishment--deangelo Ensure Enlive BID

## 2021-06-15 LAB
ALBUMIN SERPL ELPH-MCNC: 3.2 G/DL — LOW (ref 3.3–5)
ALP SERPL-CCNC: 90 U/L — SIGNIFICANT CHANGE UP (ref 40–120)
ALT FLD-CCNC: 17 U/L — SIGNIFICANT CHANGE UP (ref 12–78)
ANION GAP SERPL CALC-SCNC: 7 MMOL/L — SIGNIFICANT CHANGE UP (ref 5–17)
AST SERPL-CCNC: 22 U/L — SIGNIFICANT CHANGE UP (ref 15–37)
BASOPHILS # BLD AUTO: 0.04 K/UL — SIGNIFICANT CHANGE UP (ref 0–0.2)
BASOPHILS NFR BLD AUTO: 0.6 % — SIGNIFICANT CHANGE UP (ref 0–2)
BILIRUB SERPL-MCNC: 0.5 MG/DL — SIGNIFICANT CHANGE UP (ref 0.2–1.2)
BUN SERPL-MCNC: 19 MG/DL — SIGNIFICANT CHANGE UP (ref 7–23)
CALCIUM SERPL-MCNC: 9.7 MG/DL — SIGNIFICANT CHANGE UP (ref 8.5–10.1)
CHLORIDE SERPL-SCNC: 105 MMOL/L — SIGNIFICANT CHANGE UP (ref 96–108)
CO2 SERPL-SCNC: 27 MMOL/L — SIGNIFICANT CHANGE UP (ref 22–31)
CREAT SERPL-MCNC: 0.68 MG/DL — SIGNIFICANT CHANGE UP (ref 0.5–1.3)
EOSINOPHIL # BLD AUTO: 0.14 K/UL — SIGNIFICANT CHANGE UP (ref 0–0.5)
EOSINOPHIL NFR BLD AUTO: 2 % — SIGNIFICANT CHANGE UP (ref 0–6)
GLUCOSE SERPL-MCNC: 98 MG/DL — SIGNIFICANT CHANGE UP (ref 70–99)
HCT VFR BLD CALC: 43.8 % — SIGNIFICANT CHANGE UP (ref 34.5–45)
HGB BLD-MCNC: 14.6 G/DL — SIGNIFICANT CHANGE UP (ref 11.5–15.5)
IMM GRANULOCYTES NFR BLD AUTO: 0.3 % — SIGNIFICANT CHANGE UP (ref 0–1.5)
LYMPHOCYTES # BLD AUTO: 1.21 K/UL — SIGNIFICANT CHANGE UP (ref 1–3.3)
LYMPHOCYTES # BLD AUTO: 17.2 % — SIGNIFICANT CHANGE UP (ref 13–44)
MAGNESIUM SERPL-MCNC: 2 MG/DL — SIGNIFICANT CHANGE UP (ref 1.6–2.6)
MCHC RBC-ENTMCNC: 28.3 PG — SIGNIFICANT CHANGE UP (ref 27–34)
MCHC RBC-ENTMCNC: 33.3 GM/DL — SIGNIFICANT CHANGE UP (ref 32–36)
MCV RBC AUTO: 85 FL — SIGNIFICANT CHANGE UP (ref 80–100)
MONOCYTES # BLD AUTO: 0.59 K/UL — SIGNIFICANT CHANGE UP (ref 0–0.9)
MONOCYTES NFR BLD AUTO: 8.4 % — SIGNIFICANT CHANGE UP (ref 2–14)
NEUTROPHILS # BLD AUTO: 5.04 K/UL — SIGNIFICANT CHANGE UP (ref 1.8–7.4)
NEUTROPHILS NFR BLD AUTO: 71.5 % — SIGNIFICANT CHANGE UP (ref 43–77)
NRBC # BLD: 0 /100 WBCS — SIGNIFICANT CHANGE UP (ref 0–0)
PHOSPHATE SERPL-MCNC: 3.3 MG/DL — SIGNIFICANT CHANGE UP (ref 2.5–4.5)
PLATELET # BLD AUTO: 246 K/UL — SIGNIFICANT CHANGE UP (ref 150–400)
POTASSIUM SERPL-MCNC: 3.5 MMOL/L — SIGNIFICANT CHANGE UP (ref 3.5–5.3)
POTASSIUM SERPL-SCNC: 3.5 MMOL/L — SIGNIFICANT CHANGE UP (ref 3.5–5.3)
PROT SERPL-MCNC: 7.2 G/DL — SIGNIFICANT CHANGE UP (ref 6–8.3)
RBC # BLD: 5.15 M/UL — SIGNIFICANT CHANGE UP (ref 3.8–5.2)
RBC # FLD: 14 % — SIGNIFICANT CHANGE UP (ref 10.3–14.5)
SODIUM SERPL-SCNC: 139 MMOL/L — SIGNIFICANT CHANGE UP (ref 135–145)
WBC # BLD: 7.04 K/UL — SIGNIFICANT CHANGE UP (ref 3.8–10.5)
WBC # FLD AUTO: 7.04 K/UL — SIGNIFICANT CHANGE UP (ref 3.8–10.5)

## 2021-06-15 PROCEDURE — 99232 SBSQ HOSP IP/OBS MODERATE 35: CPT | Mod: GC

## 2021-06-15 RX ADMIN — Medication 5 MILLIGRAM(S): at 05:29

## 2021-06-15 RX ADMIN — Medication 25 MILLIGRAM(S): at 05:29

## 2021-06-15 RX ADMIN — Medication 75 MICROGRAM(S): at 05:29

## 2021-06-15 RX ADMIN — AMLODIPINE BESYLATE 2.5 MILLIGRAM(S): 2.5 TABLET ORAL at 05:29

## 2021-06-15 RX ADMIN — Medication 40 MILLIGRAM(S): at 21:45

## 2021-06-15 NOTE — PROGRESS NOTE ADULT - PROBLEM SELECTOR PLAN 4
Chronic,  on admission  - Continue home BP meds amlodipine 2.5mg and HCTZ 25mg po qd with hold parameters  - Monitor routine hemodynamics Chronic  - Continue home BP meds amlodipine 2.5mg and HCTZ 25mg po qd with hold parameters  - Monitor routine hemodynamics

## 2021-06-15 NOTE — PROGRESS NOTE ADULT - PROBLEM SELECTOR PLAN 2
As per family, pt with hx of choking and difficulty swallowing  - Dysphagia 1 puree with nectar thick liquids on teaspoon, per S&S eval  - Aspiration precautions  - Nutrition consulted for malnourishment--deangelo Ensure Enlive BID

## 2021-06-15 NOTE — PROGRESS NOTE ADULT - ASSESSMENT
88 yo female with PMHx of dementia, HTN, HLD, hypothyroidism, hyperthyroidism, brain aneurysm (1979), presenting to ER by family for gradual worsening of confusion and inability to perform ADLs. Patient admitted for placement and neurology eval.

## 2021-06-15 NOTE — PROGRESS NOTE ADULT - PROBLEM SELECTOR PLAN 1
No official dx given by PMD however patient showing stereotypical signs for >2years. Patient deconditioned, not able to perform any ADLS. Family unable to care for pt at home. Family requesting long term facility.  - CT head: Status post right frontal temporal craniotomy for surgical clipping in the right parasellar region. Metallic artifact limits evaluation of the surrounding parenchyma. Encephalomalacia on the right frontal lobe underlying the craniotomy is reidentified. Findings are unchanged since prior exam. The ventricles and sulci are prominent, compatible with age-related generalized cerebral volume loss. There is no CT evidence for acute cerebral cortical infarct. There is no evidence of hemorrhage. Small chronic lacunar infarcts in the left basal ganglia are present. There is periventricular and subcortical white matter hypoattenuation,  most compatible with chronic microvascular ischemic changes.  No mass effect is found in the brain. There is no midline shift or herniation pattern.  - Enhanced supervision for now, can start low dose Risperdal or Zyprexa if needed for agitation  - Fall and aspiration precautions  - B12, folate, ammonia level WNL  - Continue home buspar 5mg qd   - Neuro Dr. Kraus following--likely progressive dementia  - PT consulted--recommend [facility]  - Nutrition consulted--recommend Ensure Enlive BID  - SW consulted  - Palliative Dr Garcia consulted for GOC, molst completion and advance care planning No official dx given by PMD however patient showing stereotypical signs for >2years. Patient deconditioned, not able to perform any ADLS. Family unable to care for pt at home. Family requesting long term facility.  - CT head: There is no CT evidence for acute cerebral cortical infarct. There is no evidence of hemorrhage. Small chronic lacunar infarcts in the left basal ganglia are present.  Chronic microvascular ischemic changes.  No mass effect is found in the brain. There is no midline shift or herniation pattern.  - Enhanced supervision for now, can start low dose Risperdal or Zyprexa if needed for agitation  - Fall and aspiration precautions  - B12, folate, ammonia level WNL  - Continue home buspar 5mg qd   - Neuro Dr. Kraus following--likely progressive dementia  - PT consulted--recommend [facility]  - Nutrition consulted--recommend Ensure Enlive BID  - SW consulted  - Palliative Dr Garcia consulted for GOC, molst completion and advance care planning

## 2021-06-16 PROCEDURE — 99233 SBSQ HOSP IP/OBS HIGH 50: CPT | Mod: GC

## 2021-06-16 RX ORDER — LEVOTHYROXINE SODIUM 125 MCG
1 TABLET ORAL
Qty: 0 | Refills: 0 | DISCHARGE

## 2021-06-16 RX ORDER — LEVOTHYROXINE SODIUM 125 MCG
1 TABLET ORAL
Qty: 0 | Refills: 0 | DISCHARGE
Start: 2021-06-16

## 2021-06-16 RX ORDER — RISPERIDONE 4 MG/1
0.25 TABLET ORAL ONCE
Refills: 0 | Status: COMPLETED | OUTPATIENT
Start: 2021-06-16 | End: 2021-06-16

## 2021-06-16 RX ADMIN — Medication 40 MILLIGRAM(S): at 20:47

## 2021-06-16 RX ADMIN — Medication 5 MILLIGRAM(S): at 06:41

## 2021-06-16 RX ADMIN — Medication 75 MICROGRAM(S): at 06:41

## 2021-06-16 RX ADMIN — RISPERIDONE 0.25 MILLIGRAM(S): 4 TABLET ORAL at 20:47

## 2021-06-16 NOTE — HOSPICE CARE NOTE - CONVESATION DETAILS
[facility]    Patient not currently hospice inpatient appropriate . Patient approved for home /residential hospice by Dr. Dagmar Calderon. 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 PPSV2 40%. Follow up calls to both patient's spouse/PCG Donn and daughter, Rosa Elena. Family interested in hospice services at a facility since unable to care for patient at home anymore. Discussed under hospice medicare benefit ,  hospice services covered 100% . Daughter states patient does not have a 2nd payor source for SNF room and board and can not pay out of pocket for SNF. Daughter would like additional information on whether patient would qualify for  rehab or comfort measures only at  [facility] as alternative to SNF with hospice services due financial concerns.  Discussed with KIKO Alvarez who will follow up with family in AM.    Hospice referral to be put on temporary hold while POC established.     Willa Mason RN, BSN, CHPN, OCN  Hospice Inpatient Specialist  [phone]

## 2021-06-16 NOTE — PROGRESS NOTE ADULT - ASSESSMENT
88 yo female with PMHx of dementia, HTN, HLD, hypothyroidism, hyperthyroidism, brain aneurysm (1979), presenting to ER by family for gradual worsening of confusion and inability to perform ADLs. Patient admitted for placement and neurology eval. Pending home hospice referral.

## 2021-06-16 NOTE — PROGRESS NOTE ADULT - PROBLEM SELECTOR PLAN 4
Chronic  - Continue home BP meds amlodipine 2.5mg and HCTZ 25mg po qd with hold parameters  - Monitor routine hemodynamics

## 2021-06-16 NOTE — PROGRESS NOTE ADULT - PROBLEM SELECTOR PLAN 1
Alert-The patient is alert, awake and responds to voice. The patient is oriented to time, place, and person. The triage nurse is able to obtain subjective information. No official dx given by PMD however patient showing stereotypical signs for >2years. Patient deconditioned, not able to perform any ADLS. Family unable to care for pt at home. Family requesting long term facility.  - CT head: There is no CT evidence for acute cerebral cortical infarct. There is no evidence of hemorrhage. Small chronic lacunar infarcts in the left basal ganglia are present.  Chronic microvascular ischemic changes.  No mass effect is found in the brain. There is no midline shift or herniation pattern.  - Enhanced supervision for now, can start low dose Risperdal or Zyprexa if needed for agitation  - Fall and aspiration precautions  - B12, folate, ammonia level WNL  - Continue home buspar 5mg qd   - Neuro Dr. Kraus following--likely progressive dementia  - PT consulted--recommend [facility]  - Nutrition consulted--recommend Ensure Enlive BID  - SW consulted  - Palliative Dr Garcia consulted--pt is DNR/DNI and pending home hospice referral

## 2021-06-16 NOTE — HOSPICE CARE NOTE - CONVESATION DETAILS
[facility]    Hospice referral received. Approval pending. Referral workup currently in progress.    Willa Mason RN, BSN, CHPN, OCN  Hospice Inpatient Specialist  [phone]   [facility]    Hospice referral received for INN. Pt currently not on any IV meds for symptom management  . Referral workup currently in progress for home/residential hospice..    Willa Mason RN, BSN, CHPN, OCN  Hospice Inpatient Specialist  [phone]   [facility]    Hospice referral received for INN. Pt currently not on any IV meds for symptom management.  Referral workup currently in progress for home/residential hospice.    TCT  Donn at [phone], no answer. LVM with call back number to discuss hospice and GOC.         Willa Mason, RN, BSN, CHPN, OCN  Hospice Inpatient Specialist  [phone]

## 2021-06-16 NOTE — CHART NOTE - NSCHARTNOTEFT_GEN_A_CORE
Assessment:  Pt seen for nutrition follow up, chart reviewed, hospital course noted.  88 yo female with PMHx of dementia, HTN, HLD, hypothyroidism, hyperthyroidism, brain aneurysm (1979), presenting to ER by family for gradual worsening of confusion and inability to perform ADLs. Patient admitted for placement and neurology eval.   Pt seen at bedside, asleep, not disturbed.  Pt s/p swallow eval 6/15 with recommendation for puree diet nectar thick liquids via tsp.  Per RN and CNA pt has not been eating, has been very uncooperative, pushing spoon away, spitting out food.  CNA able to only get a few tsp yogurt and thickened water today.  Fecal incontinence noted 6/16.   GOC discussion noted pt is DNR/DNI however wishes regarding artificial feeding not noted.    Factors impacting intake: [ ] none [ ] nausea  [ ] vomiting [ ] diarrhea [ ] constipation  [ ]chewing problems [x] swallowing issues  [x] other: dementia, meal refusal    Diet Presciption: Diet, Dysphagia 1 Pureed-Nectar Consistency Fluid (06-14-21 @ 13:14)    Intake: poor, refusing to eat    Current Weight: 6/12 114.8#, 6/16 119.9#    Pertinent Medications: MEDICATIONS  (STANDING):  amLODIPine   Tablet 2.5 milliGRAM(s) Oral daily  busPIRone 5 milliGRAM(s) Oral <User Schedule>  hydrochlorothiazide 25 milliGRAM(s) Oral daily  levothyroxine 75 MICROGram(s) Oral daily  pravastatin 40 milliGRAM(s) Oral at bedtime    MEDICATIONS  (PRN):    Pertinent Labs: 06-15 Na139 mmol/L Glu 98 mg/dL K+ 3.5 mmol/L Cr  0.68 mg/dL BUN 19 mg/dL 06-15 Phos 3.3 mg/dL 06-15 Alb 3.2 g/dL<L>      Skin: no pressure injuries  Edema: none noted    Estimated Needs:   [x] no change since previous assessment  [ ] recalculated:     Previous Nutrition Diagnosis:   [x] Malnutrition     Nutrition Diagnosis is [x] ongoing  [ ] resolved [ ] not applicable     New Nutrition Diagnosis: [x] Swallowing Difficulty related to motor dysfunction evidenced by bedside swallowing eval 6/15.      Interventions:   Recommend continue puree diet nectar thick liquids via spoon.   [ ] Change Diet To:  [ ] Nutrition Supplement  [ ] Nutrition Support  [x] Other:     1) Continue with Puree diet nectar thick liquids via tsp  2) Recommend Ensure Enlive BID to provide additional source of energy and protein,   3) Encourage adequate PO intake, food preferences obtained from pt's daughter, continue to provide pt with meal assistance,   4) Monitor pt's PO intake, weight, skin, edema, GI distress    Monitoring and Evaluation:   [ ] PO intake [ x ] Tolerance to diet prescription [ x ] weights [ x ] labs[ x ] follow up per protocol  [ ] other:
Called by RN as patient becoming increasingly agitated. Per RN, patient is calling out for help repeatedly and daughters asking for something to calm her down to sleep. Patient seen at bedside, picking at her hospital gown, repetitively saying "I need to get out." She is currently A&Ox1, which is her baseline mental status. She appears very restless and is wanting to get out of bed. Upon chart review, patient noted to have history of dementia and with no PRN orders, however, per hospitalist note today patient can be started on low dose Risperdal or Zyprexa as needed for agitation. Will give 0.25mg Risperidone STAT and continue to monitor for now.     Will continue to follow, RN to call if any changes.    Vital Signs Last 24 Hrs  T(C): 36.8 (16 Jun 2021 13:05), Max: 36.9 (15 Jhon 2021 20:40)  T(F): 98.2 (16 Jun 2021 13:05), Max: 98.5 (15 Jhon 2021 20:40)  HR: 78 (16 Jun 2021 13:05) (78 - 103)  BP: 118/73 (16 Jun 2021 13:05) (101/60 - 124/71)  BP(mean): --  RR: 19 (16 Jun 2021 13:05) (18 - 19)  SpO2: 92% (16 Jun 2021 13:05) (92% - 96%)    Carmen Lopez DO   PGY-1  Spectra #[id number]

## 2021-06-17 PROCEDURE — 99233 SBSQ HOSP IP/OBS HIGH 50: CPT | Mod: GC

## 2021-06-17 RX ORDER — RISPERIDONE 4 MG/1
0.25 TABLET ORAL EVERY 12 HOURS
Refills: 0 | Status: DISCONTINUED | OUTPATIENT
Start: 2021-06-17 | End: 2021-06-18

## 2021-06-17 RX ORDER — RISPERIDONE 4 MG/1
0.25 TABLET ORAL EVERY 6 HOURS
Refills: 0 | Status: DISCONTINUED | OUTPATIENT
Start: 2021-06-17 | End: 2021-06-17

## 2021-06-17 RX ORDER — RISPERIDONE 4 MG/1
1 TABLET ORAL
Qty: 0 | Refills: 0 | DISCHARGE
Start: 2021-06-17

## 2021-06-17 RX ADMIN — Medication 5 MILLIGRAM(S): at 05:54

## 2021-06-17 RX ADMIN — Medication 40 MILLIGRAM(S): at 21:23

## 2021-06-17 RX ADMIN — Medication 75 MICROGRAM(S): at 05:54

## 2021-06-17 NOTE — PROGRESS NOTE ADULT - ASSESSMENT
89 yo female with PMHx of dementia, HTN, HLD, hypothyroidism, hyperthyroidism, brain aneurysm (1979), presenting to ER by family for gradual worsening of confusion and inability to perform ADLs. Patient admitted for placement and neurology eval. Pending home hospice referral. 88 yo female with PMHx of dementia, HTN, HLD, hypothyroidism, hyperthyroidism, brain aneurysm (1979), presenting to ER by family for gradual worsening of confusion and inability to perform ADLs. Patient admitted for Failure to thrive requiring placement and neurology eval. Pending home hospice referral.

## 2021-06-17 NOTE — PROGRESS NOTE ADULT - ATTENDING COMMENTS
89 yo female with PMHx of dementia, HTN, HLD, hypothyroidism, hyperthyroidism, brain aneurysm (1979), presenting to ER by family for gradual worsening of confusion and inability to perform ADLs. Patient admitted for Failure to thrive requiring placement and neurology eval. Pending home hospice referral. Plan: dc planning underway, apprec palliative collaboration in care as well as sw care coordination
Patient seen and examined at bedside. Patient remains only oriented to her name. No acute overnight events. PT recommending [facility], choices provided to family. Palliative Care consulted, patient is DNR/DNI.
89 yo female with PMHx of dementia, HTN, HLD, hypothyroidism, hyperthyroidism, brain aneurysm (1979), presenting to ER by family for gradual worsening of confusion and inability to perform ADLs. Patient admitted for placement and neurology eval. Patient is medically stable and appropriate for hospice level care. Most likely disposition is [facility] and if not making progress roll over into long term care with hospice.
Patient seen and examined at bedside. Patient AAOx0. Patient denies any pain. PT recommending [facility]. Appreciate Endo recs, restarted levothyroxine at 75mcg daily.
Patient seen and examined. Patient remains confused. Plan for placement to [facility] with transfer to LTC. Discussed with daughter at bedside. Daughter concerned about patient's advanced dementia, patient's recent weight loss (as per daughter has lost 15 lbs in past month) and patient's poor nutritional status. Daughter would like referral to hospice inn, SW made aware. Palliative care MD to evaluate patient in AM.

## 2021-06-17 NOTE — PROGRESS NOTE ADULT - PROBLEM SELECTOR PLAN 3
As per , patient dx with hyperthyroid originally, treated with iodine. However now hypothyroid.   - TSH low at 0.03   - T3 and T4 WNL  - Continue decreased dose of levothyroxine 75mcg daily per endo recs   - Endocrine Dr Perlman following--re-check TFTs in 6 weeks
As per , patient dx with hyperthyroid originally, treated with iodine. However now hypothyroid.   - TSH low at 0.03   - T3 and T4 WNL  - Decrease levothyroxine to 75mcg daily per endo recs   - Endocrine Dr Perlman following--re-check TFTs in 6 weeks
As per , patient dx with hyperthyroid originally, treated with iodine. However now hypothyroid.   - TSH low at 0.03   - T3 and T4 WNL  - Continue decreased dose of levothyroxine 75mcg daily per endo recs   - Endocrine Dr Perlman following--re-check TFTs in 6 weeks

## 2021-06-17 NOTE — PROGRESS NOTE ADULT - PROBLEM SELECTOR PLAN 5
Chronic, stable  - Continue home pravastatin 40 mg po qd    DVT PPX: SCDs given pt is a fall risk with hx of falls    Dispo: Pending home hospice referral vs comfort measures at [facility]
Chronic, stable  - Continue home pravastatin 40 mg po qd    DVT PPX: SCDs given pt is a fall risk with hx of falls
Chronic, stable  - Continue home pravastatin 40 mg po qd    DVT PPX: SCDs given pt is a fall risk with hx of falls
Chronic, stable  - Continue home pravastatin 40 mg po qd    DVT PPX: SCDs given pt is a fall risk with hx of falls    Dispo: Pending home hospice referral
Chronic, stable  - Continue home pravastatin 40 mg po qd    DVT PPX: SCDs given pt is a fall risk with hx of falls    Dispo: [facility], then LTC

## 2021-06-17 NOTE — PROGRESS NOTE ADULT - TIME BILLING
care coordination, plan of care, discussed with 1E IDR team
review of charts, notes, consults, discussion of care plan with daughter including prognosis, discharge planning

## 2021-06-17 NOTE — PROGRESS NOTE ADULT - NUTRITIONAL ASSESSMENT
This patient has been assessed with a concern for Malnutrition and has been determined to have a diagnosis/diagnoses of Moderate protein-calorie malnutrition.    This patient is being managed with:   Diet Dysphagia 1 Pureed-Nectar Consistency Fluid-  Supplement Feeding Modality:  Oral  Ensure Enlive Servings Per Day:  1       Frequency:  Two Times a day  Entered: Jun 16 2021 12:12PM    
This patient has been assessed with a concern for Malnutrition and has been determined to have a diagnosis/diagnoses of Moderate protein-calorie malnutrition.    This patient is being managed with:   Diet Dysphagia 3 Soft-Thin Liquids-  Supplement Feeding Modality:  Oral  Ensure Enlive Servings Per Day:  1       Frequency:  Two Times a day  Entered: Jun 13 2021 11:07AM    
This patient has been assessed with a concern for Malnutrition and has been determined to have a diagnosis/diagnoses of Moderate protein-calorie malnutrition.    This patient is being managed with:   Diet Dysphagia 1 Pureed-Nectar Consistency Fluid-  Supplement Feeding Modality:  Oral  Ensure Enlive Servings Per Day:  1       Frequency:  Two Times a day  Entered: Jun 16 2021 12:12PM    
This patient has been assessed with a concern for Malnutrition and has been determined to have a diagnosis/diagnoses of Moderate protein-calorie malnutrition.    This patient is being managed with:   Diet Dysphagia 1 Pureed-Nectar Consistency Fluid-  Entered: Jun 14 2021  1:13PM

## 2021-06-17 NOTE — PROGRESS NOTE ADULT - PROBLEM SELECTOR PLAN 1
No official dx given by PMD however patient showing stereotypical signs for >2years. Patient deconditioned, not able to perform any ADLS. Family unable to care for pt at home. Family requesting long term facility.  - CT head: There is no CT evidence for acute cerebral cortical infarct. There is no evidence of hemorrhage. Small chronic lacunar infarcts in the left basal ganglia are present.  Chronic microvascular ischemic changes.  No mass effect is found in the brain. There is no midline shift or herniation pattern.  - Will start Risperdal 0.25mg BID PRN for agitation  - Fall and aspiration precautions  - B12, folate, ammonia level WNL  - Continue home buspar 5mg qd   - Neuro Dr. Kraus following--likely progressive dementia  - PT consulted--recommend [facility]  - Nutrition consulted--recommend Ensure Enlive BID  - SW consulted  - Palliative Dr Garcia consulted--pt is DNR/DNI and pending home hospice referral vs comfort measures at [facility] No official dx given by PMD however patient showing stereotypical signs for >2years. Patient deconditioned, not able to perform any ADLS. Family unable to care for pt at home. Family requesting long term facility.  - CT head: There is no CT evidence for acute cerebral cortical infarct. There is no evidence of hemorrhage. Small chronic lacunar infarcts in the left basal ganglia are present.  Chronic microvascular ischemic changes.  No mass effect is found in the brain. There is no midline shift or herniation pattern.  - cont Risperdal 0.25mg BID PRN for agitation  - Fall and aspiration precautions  - B12, folate, ammonia level WNL  - Continue home buspar 5mg qd   - Neuro Dr. Kraus following--likely progressive dementia  - PT consulted--recommend [facility]  - Nutrition consulted--recommend Ensure Enlive BID  - SW consulted  - Palliative Dr Garcia consulted--pt is DNR/DNI and pending home hospice referral vs comfort measures at [facility]

## 2021-06-18 VITALS
DIASTOLIC BLOOD PRESSURE: 66 MMHG | TEMPERATURE: 99 F | HEART RATE: 81 BPM | SYSTOLIC BLOOD PRESSURE: 97 MMHG | RESPIRATION RATE: 18 BRPM | OXYGEN SATURATION: 94 %

## 2021-06-18 PROCEDURE — U0005: CPT

## 2021-06-18 PROCEDURE — 92610 EVALUATE SWALLOWING FUNCTION: CPT

## 2021-06-18 PROCEDURE — 84100 ASSAY OF PHOSPHORUS: CPT

## 2021-06-18 PROCEDURE — U0003: CPT

## 2021-06-18 PROCEDURE — 97530 THERAPEUTIC ACTIVITIES: CPT

## 2021-06-18 PROCEDURE — 85027 COMPLETE CBC AUTOMATED: CPT

## 2021-06-18 PROCEDURE — 97163 PT EVAL HIGH COMPLEX 45 MIN: CPT

## 2021-06-18 PROCEDURE — 83735 ASSAY OF MAGNESIUM: CPT

## 2021-06-18 PROCEDURE — 97116 GAIT TRAINING THERAPY: CPT

## 2021-06-18 PROCEDURE — 70450 CT HEAD/BRAIN W/O DYE: CPT

## 2021-06-18 PROCEDURE — 85025 COMPLETE CBC W/AUTO DIFF WBC: CPT

## 2021-06-18 PROCEDURE — 36415 COLL VENOUS BLD VENIPUNCTURE: CPT

## 2021-06-18 PROCEDURE — 84479 ASSAY OF THYROID (T3 OR T4): CPT

## 2021-06-18 PROCEDURE — 84436 ASSAY OF TOTAL THYROXINE: CPT

## 2021-06-18 PROCEDURE — 86769 SARS-COV-2 COVID-19 ANTIBODY: CPT

## 2021-06-18 PROCEDURE — 84443 ASSAY THYROID STIM HORMONE: CPT

## 2021-06-18 PROCEDURE — 82607 VITAMIN B-12: CPT

## 2021-06-18 PROCEDURE — 80053 COMPREHEN METABOLIC PANEL: CPT

## 2021-06-18 PROCEDURE — 84439 ASSAY OF FREE THYROXINE: CPT

## 2021-06-18 PROCEDURE — 93005 ELECTROCARDIOGRAM TRACING: CPT

## 2021-06-18 PROCEDURE — 99285 EMERGENCY DEPT VISIT HI MDM: CPT | Mod: 25

## 2021-06-18 PROCEDURE — 80048 BASIC METABOLIC PNL TOTAL CA: CPT

## 2021-06-18 PROCEDURE — 99239 HOSP IP/OBS DSCHRG MGMT >30: CPT

## 2021-06-18 PROCEDURE — 82140 ASSAY OF AMMONIA: CPT

## 2021-06-18 PROCEDURE — 84481 FREE ASSAY (FT-3): CPT

## 2021-06-18 PROCEDURE — 82746 ASSAY OF FOLIC ACID SERUM: CPT

## 2021-06-18 RX ADMIN — Medication 75 MICROGRAM(S): at 05:20

## 2021-06-18 RX ADMIN — AMLODIPINE BESYLATE 2.5 MILLIGRAM(S): 2.5 TABLET ORAL at 05:20

## 2021-06-18 RX ADMIN — Medication 5 MILLIGRAM(S): at 05:20

## 2021-06-18 RX ADMIN — Medication 25 MILLIGRAM(S): at 05:20

## 2021-06-18 NOTE — PROGRESS NOTE ADULT - PROVIDER SPECIALTY LIST ADULT
Hospitalist
Neurology
Hospitalist
Hospitalist
Neurology
Neurology
Hospitalist
Hospitalist

## 2021-06-18 NOTE — PROGRESS NOTE ADULT - REASON FOR ADMISSION
failure to thrive

## 2021-06-18 NOTE — PROGRESS NOTE ADULT - SUBJECTIVE AND OBJECTIVE BOX
Neurology follow up note    LUIS ANTONIO [id number]      Interval History:    Patient resting in bed     MEDICATIONS    amLODIPine   Tablet 2.5 milliGRAM(s) Oral daily  busPIRone 5 milliGRAM(s) Oral <User Schedule>  hydrochlorothiazide 25 milliGRAM(s) Oral daily  levothyroxine 75 MICROGram(s) Oral daily  pravastatin 40 milliGRAM(s) Oral at bedtime      Allergies    No Known Allergies    Intolerances            Vital Signs Last 24 Hrs  T(C): 37 (14 Jun 2021 05:20), Max: 37 (14 Jun 2021 05:20)  T(F): 98.6 (14 Jun 2021 05:20), Max: 98.6 (14 Jun 2021 05:20)  HR: 87 (14 Jun 2021 05:20) (68 - 87)  BP: 141/60 (14 Jun 2021 05:20) (110/71 - 141/60)  BP(mean): --  RR: 18 (14 Jun 2021 05:20) (18 - 19)  SpO2: 93% (14 Jun 2021 05:20) (92% - 95%)    REVIEW OF SYSTEMS:  Constitutional:  Very limited from the patient secondary to the patient's memory issue but as per my conversation with spouse, no history of fever, chills, night sweats.  No headache.  Eyes:  No double vision or blurry vision.  Ears:  No ringing in the ears.  Neck:  No neck pain.  Cardiovascular:  No chest pain.  Respiratory:  No shortness of breath.  Abdomen:  No nausea, vomiting, or abdominal pain. Extremities/Neurological:  No numbness or tingling.  Musculoskeletal:  No joint pain.  General:  Positive history of increase in forgetfulness, difficulty ambulating, periods of agitation.    PHYSICAL EXAMINATION:  HEENT:  Head:  Normocephalic, atraumatic.  Eyes:  No scleral icterus.  Ears:  Hearing bilaterally was intact.  NECK:  Supple.  CARDIOVASCULAR:  S1 and S2 heard.  RESPIRATORY:  Good air entry bilaterally.  ABDOMEN:  Soft and nontender.  EXTREMITIES:  No clubbing or cyanosis were noted.      NEUROLOGIC:  The patient is awake.  Location unknown.  Year and month were unknown.  As per my conversation with spouse, normally will not know that.  Had difficult naming simple objects.  Extraocular movements were intact.  Speech was fluent.  Smile symmetric.  Motor:  Bilateral upper and lower were 4/5.  Sensory:  Bilateral upper and lower were intact to light touch.  Gait:  The patient had difficulty ambulating with nursing staff.              LABS:  CBC Full  -  ( 14 Jun 2021 05:49 )  WBC Count : 7.61 K/uL  RBC Count : 4.94 M/uL  Hemoglobin : 14.1 g/dL  Hematocrit : 42.0 %  Platelet Count - Automated : 232 K/uL  Mean Cell Volume : 85.0 fl  Mean Cell Hemoglobin : 28.5 pg  Mean Cell Hemoglobin Concentration : 33.6 gm/dL  Auto Neutrophil # : 5.47 K/uL  Auto Lymphocyte # : 1.34 K/uL  Auto Monocyte # : 0.68 K/uL  Auto Eosinophil # : 0.08 K/uL  Auto Basophil # : 0.03 K/uL  Auto Neutrophil % : 71.9 %  Auto Lymphocyte % : 17.6 %  Auto Monocyte % : 8.9 %  Auto Eosinophil % : 1.1 %  Auto Basophil % : 0.4 %      06-14    141  |  108  |  20  ----------------------------<  103<H>  3.9   |  24  |  0.59    Ca    9.4      14 Jun 2021 05:49  Phos  2.7     06-14  Mg     1.5     06-14    TPro  6.9  /  Alb  3.1<L>  /  TBili  0.3  /  DBili  x   /  AST  22  /  ALT  13  /  AlkPhos  85  06-14    Hemoglobin A1C:     LIVER FUNCTIONS - ( 14 Jun 2021 05:49 )  Alb: 3.1 g/dL / Pro: 6.9 g/dL / ALK PHOS: 85 U/L / ALT: 13 U/L / AST: 22 U/L / GGT: x           Vitamin B12         RADIOLOGY  ANALYSIS AND PLAN:  This is an 89-year-old with increase in forgetfulness and altered mental status.  For episode of altered mental status, suspect most likely secondary to progressive dementia.  As per my conversation with spouse, the patient has been gradually deteriorating for the last few years.  I would recommend to rule out other causes of metabolic encephalopathy.    For history of hypothyroidism, TSH noted.  Adjustment of medications as needed.  For history of hypertension, continue to monitor systolic blood pressure.  For history of hyperlipidemia, continue the patient on home statin.  spoke to staff eat breakfast in am no overnight events     I spoke with spouse, Donn, at [phone] 6/13/2021.  It appears that at the point that they requested possibly placement.    Greater than 45 minutes of time was spent with the patient, plan of care, reviewing data, with greater than 50% of the visit was spent counseling and/or coordinating care with multidisciplinary healthcare team  
Neurology follow up note    LUIS ANTONIO [id number]      Interval History:    Patient feels ok no new complaints.    MEDICATIONS    amLODIPine   Tablet 2.5 milliGRAM(s) Oral daily  busPIRone 5 milliGRAM(s) Oral <User Schedule>  hydrochlorothiazide 25 milliGRAM(s) Oral daily  levothyroxine 75 MICROGram(s) Oral daily  pravastatin 40 milliGRAM(s) Oral at bedtime      Allergies    No Known Allergies    Intolerances            Vital Signs Last 24 Hrs  T(C): 36.9 (15 Jhon 2021 04:34), Max: 36.9 (15 Jhon 2021 04:34)  T(F): 98.4 (15 Jhon 2021 04:34), Max: 98.4 (15 Jhon 2021 04:34)  HR: 69 (15 Jhon 2021 04:34) (69 - 85)  BP: 112/76 (15 Jhon 2021 04:34) (112/76 - 126/82)  BP(mean): --  RR: 17 (15 Jhon 2021 04:34) (17 - 19)  SpO2: 92% (15 Jhon 2021 04:34) (92% - 93%)    REVIEW OF SYSTEMS:  Constitutional:  Very limited from the patient secondary to the patient's memory issue but as per my conversation with spouse, no history of fever, chills, night sweats.  No headache.  Eyes:  No double vision or blurry vision.  Ears:  No ringing in the ears.  Neck:  No neck pain.  Cardiovascular:  No chest pain.  Respiratory:  No shortness of breath.  Abdomen:  No nausea, vomiting, or abdominal pain. Extremities/Neurological:  No numbness or tingling.  Musculoskeletal:  No joint pain.  General:  Positive history of increase in forgetfulness, difficulty ambulating, periods of agitation.    PHYSICAL EXAMINATION:  HEENT:  Head:  Normocephalic, atraumatic.  Eyes:  No scleral icterus.  Ears:  Hearing bilaterally was intact.  NECK:  Supple.  CARDIOVASCULAR:  S1 and S2 heard.  RESPIRATORY:  Good air entry bilaterally.  ABDOMEN:  Soft and nontender.  EXTREMITIES:  No clubbing or cyanosis were noted.      NEUROLOGIC:  The patient is awake.  Location unknown.  Year and month were unknown.  As per my conversation with spouse, normally will not know that.  Had difficult naming simple objects.  Extraocular movements were intact.  Speech was fluent.  Smile symmetric.  Motor:  Bilateral upper and lower were 4/5.  Sensory:  Bilateral upper and lower were intact to light touch.  Gait:  The patient had difficulty ambulating with nursing staff.                   LABS:  CBC Full  -  ( 15 Jhon 2021 06:36 )  WBC Count : 7.04 K/uL  RBC Count : 5.15 M/uL  Hemoglobin : 14.6 g/dL  Hematocrit : 43.8 %  Platelet Count - Automated : 246 K/uL  Mean Cell Volume : 85.0 fl  Mean Cell Hemoglobin : 28.3 pg  Mean Cell Hemoglobin Concentration : 33.3 gm/dL  Auto Neutrophil # : 5.04 K/uL  Auto Lymphocyte # : 1.21 K/uL  Auto Monocyte # : 0.59 K/uL  Auto Eosinophil # : 0.14 K/uL  Auto Basophil # : 0.04 K/uL  Auto Neutrophil % : 71.5 %  Auto Lymphocyte % : 17.2 %  Auto Monocyte % : 8.4 %  Auto Eosinophil % : 2.0 %  Auto Basophil % : 0.6 %      06-15    139  |  105  |  19  ----------------------------<  98  3.5   |  27  |  0.68    Ca    9.7      15 Jhon 2021 06:36  Phos  3.3     06-15  Mg     2.0     06-15    TPro  7.2  /  Alb  3.2<L>  /  TBili  0.5  /  DBili  x   /  AST  22  /  ALT  17  /  AlkPhos  90  06-15    Hemoglobin A1C:     LIVER FUNCTIONS - ( 15 Jhon 2021 06:36 )  Alb: 3.2 g/dL / Pro: 7.2 g/dL / ALK PHOS: 90 U/L / ALT: 17 U/L / AST: 22 U/L / GGT: x           Vitamin B12 Vitamin B12, Serum: 516 pg/mL (06-14 @ 10:05)          RADIOLOGY      ANALYSIS AND PLAN:  This is an 89-year-old with increase in forgetfulness and altered mental status.  For episode of altered mental status, suspect most likely secondary to progressive dementia.  As per my conversation with spouse, the patient has been gradually deteriorating for the last few years.  For history of hypothyroidism, TSH noted.  Adjustment of medications as needed.  For history of hypertension, continue to monitor systolic blood pressure.  For history of hyperlipidemia, continue the patient on home statin.  spoke to staff no overnight events     I spoke with spouse, Donn, at [phone] 6/13/2021.  It appears that at the point that they requested possibly placement.    Greater than 40 minutes of time was spent with the patient, plan of care, reviewing data, with greater than 50% of the visit was spent counseling and/or coordinating care with multidisciplinary healthcare team  
Neurology follow up note    LUIS ANTONIO [id number]      Interval History:    Patient resting in bed     MEDICATIONS    amLODIPine   Tablet 2.5 milliGRAM(s) Oral daily  busPIRone 5 milliGRAM(s) Oral <User Schedule>  hydrochlorothiazide 25 milliGRAM(s) Oral daily  levothyroxine 75 MICROGram(s) Oral daily  pravastatin 40 milliGRAM(s) Oral at bedtime  [id number]   Tablet 0.25 milliGRAM(s) Oral every 12 hours PRN      Allergies    No Known Allergies    Intolerances            Vital Signs Last 24 Hrs  T(C): 36.5 (18 Jun 2021 05:07), Max: 36.9 (17 Jun 2021 20:26)  T(F): 97.7 (18 Jun 2021 05:07), Max: 98.4 (17 Jun 2021 20:26)  HR: 67 (18 Jun 2021 05:07) (65 - 92)  BP: 149/64 (18 Jun 2021 05:07) (88/65 - 152/76)  BP(mean): --  RR: 18 (18 Jun 2021 05:07) (18 - 19)  SpO2: 93% (18 Jun 2021 05:07) (93% - 95%)    REVIEW OF SYSTEMS:  Constitutional:  Very limited from the patient secondary to the patient's memory issue but as per my conversation with spouse, no history of fever, chills, night sweats.  No headache.  Eyes:  No double vision or blurry vision.  Ears:  No ringing in the ears.  Neck:  No neck pain.  Cardiovascular:  No chest pain.  Respiratory:  No shortness of breath.  Abdomen:  No nausea, vomiting, or abdominal pain. Extremities/Neurological:  No numbness or tingling.  Musculoskeletal:  No joint pain.  General:  Positive history of increase in forgetfulness, difficulty ambulating, periods of agitation.    PHYSICAL EXAMINATION:  HEENT:  Head:  Normocephalic, atraumatic.  Eyes:  No scleral icterus.  Ears:  Hearing bilaterally was intact.  NECK:  Supple.  CARDIOVASCULAR:  S1 and S2 heard.  RESPIRATORY:  Good air entry bilaterally.  ABDOMEN:  Soft and nontender.  EXTREMITIES:  No clubbing or cyanosis were noted.      NEUROLOGIC:  The patient is awake.  Location unknown.  Year and month were unknown.  As per my conversation with spouse, normally will not know that.  Had difficult naming simple objects.  Extraocular movements were intact.  Speech was fluent.  Smile symmetric.  Motor:  Bilateral upper and lower were 4/5.  Sensory:  Bilateral upper and lower were intact to light touch.  Gait:  The patient had difficulty ambulating with nursing staff.                   LABS:            Hemoglobin A1C:       Vitamin B12         RADIOLOGY    ANALYSIS AND PLAN:  This is an 89-year-old with increase in forgetfulness and altered mental status.  For episode of altered mental status, suspect most likely secondary to progressive dementia.  As per my conversation with spouse, the patient has been gradually deteriorating for the last few years.  For history of hypothyroidism, TSH noted.  Adjustment of medications as needed.  For history of hypertension, continue to monitor systolic blood pressure.  For history of hyperlipidemia, continue the patient on home statin.  monitor intake as needed   spoke to staff no overnight events   possible hospice     I spoke with spouse, Donn, at [phone] 6/13/2021.  It appears that at the point that they requested possibly placement.    Greater than 30 minutes of time was spent with the patient, plan of care, reviewing data, with greater than 50% of the visit was spent counseling and/or coordinating care with multidisciplinary healthcare team    
Patient is a 89y old  Female who presents with a chief complaint of failure to thrive (14 Jun 2021 09:14)      INTERVAL HPI/OVERNIGHT EVENTS: No acute overnight events. Patient seen and examined at bedside this morning. Denies CP, abdominal pain, or difficulty breathing.    MEDICATIONS  (STANDING):  amLODIPine   Tablet 2.5 milliGRAM(s) Oral daily  busPIRone 5 milliGRAM(s) Oral <User Schedule>  hydrochlorothiazide 25 milliGRAM(s) Oral daily  levothyroxine 75 MICROGram(s) Oral daily  pravastatin 40 milliGRAM(s) Oral at bedtime    MEDICATIONS  (PRN):      Allergies    No Known Allergies    Intolerances        REVIEW OF SYSTEMS: ROS limited 2/2 patient's dementia. Denies CP, abdominal pain, difficulty breathing.      Vital Signs Last 24 Hrs  T(C): 37 (14 Jun 2021 05:20), Max: 37 (14 Jun 2021 05:20)  T(F): 98.6 (14 Jun 2021 05:20), Max: 98.6 (14 Jun 2021 05:20)  HR: 87 (14 Jun 2021 05:20) (68 - 87)  BP: 141/60 (14 Jun 2021 05:20) (110/71 - 141/60)  BP(mean): --  RR: 18 (14 Jun 2021 05:20) (18 - 19)  SpO2: 93% (14 Jun 2021 05:20) (92% - 95%)    PHYSICAL EXAM:  GENERAL: NAD  HEENT:  EOMI, moist mucous membranes  CHEST/LUNG:  CTA b/l, no rales, wheezes, or rhonchi  HEART:  RRR, S1, S2, no murmur  ABDOMEN:  BS+, soft, nontender, nondistended  EXTREMITIES: no edema or calf tenderness  SKIN:  warm, dry  NERVOUS SYSTEM: AA&Ox1 (unsure of last name, date, or place)    LABS:                        14.1   7.61  )-----------( 232      ( 14 Jun 2021 05:49 )             42.0     CBC Full  -  ( 14 Jun 2021 05:49 )  WBC Count : 7.61 K/uL  Hemoglobin : 14.1 g/dL  Hematocrit : 42.0 %  Platelet Count - Automated : 232 K/uL  Mean Cell Volume : 85.0 fl  Mean Cell Hemoglobin : 28.5 pg  Mean Cell Hemoglobin Concentration : 33.6 gm/dL  Auto Neutrophil # : 5.47 K/uL  Auto Lymphocyte # : 1.34 K/uL  Auto Monocyte # : 0.68 K/uL  Auto Eosinophil # : 0.08 K/uL  Auto Basophil # : 0.03 K/uL  Auto Neutrophil % : 71.9 %  Auto Lymphocyte % : 17.6 %  Auto Monocyte % : 8.9 %  Auto Eosinophil % : 1.1 %  Auto Basophil % : 0.4 %    14 Jun 2021 05:49    141    |  108    |  20     ----------------------------<  103    3.9     |  24     |  0.59     Ca    9.4        14 Jun 2021 05:49  Phos  2.7       14 Jun 2021 05:49  Mg     1.5       14 Jun 2021 05:49    TPro  6.9    /  Alb  3.1    /  TBili  0.3    /  DBili  x      /  AST  22     /  ALT  13     /  AlkPhos  85     14 Jun 2021 05:49        CAPILLARY BLOOD GLUCOSE              RADIOLOGY & ADDITIONAL TESTS: No additional imaging      Consultant(s) Notes Reviewed:  [x] YES  [ ] NO    Care Discussed with [x] Consultants  [x] Patient  [ ] Family  [ ]      [ ] Other; RN  DVT ppx  
Patient is a 89y old  Female who presents with a chief complaint of failure to thrive (16 Jun 2021 08:24)      INTERVAL HPI/OVERNIGHT EVENTS: No acute overnight events. Patient seen and examined at bedside this morning.    MEDICATIONS  (STANDING):  amLODIPine   Tablet 2.5 milliGRAM(s) Oral daily  busPIRone 5 milliGRAM(s) Oral <User Schedule>  hydrochlorothiazide 25 milliGRAM(s) Oral daily  levothyroxine 75 MICROGram(s) Oral daily  pravastatin 40 milliGRAM(s) Oral at bedtime    MEDICATIONS  (PRN):      Allergies    No Known Allergies    Intolerances        REVIEW OF SYSTEMS: Unable to obtain comprehensive ROS 2/2 dementia.    Vital Signs Last 24 Hrs  T(C): 36.8 (16 Jun 2021 13:05), Max: 36.9 (15 Jhon 2021 20:40)  T(F): 98.2 (16 Jun 2021 13:05), Max: 98.5 (15 Jhon 2021 20:40)  HR: 78 (16 Jun 2021 13:05) (78 - 103)  BP: 118/73 (16 Jun 2021 13:05) (101/60 - 124/71)  BP(mean): --  RR: 19 (16 Jun 2021 13:05) (18 - 19)  SpO2: 92% (16 Jun 2021 13:05) (92% - 96%)    PHYSICAL EXAM:  GENERAL: NAD, sleeping, while awake appears confused  HEENT:  EOMI, moist mucous membranes  CHEST/LUNG:  CTA b/l, no rales, wheezes, or rhonchi  HEART:  RRR, S1, S2, no murmur  ABDOMEN:  BS+, soft, nontender, nondistended  EXTREMITIES: no edema or calf tenderness  SKIN:  warm, dry  NERVOUS SYSTEM: A&Ox1, Responds to first name.     LABS:    CBC Full  -  ( 15 Jhon 2021 06:36 )  WBC Count : 7.04 K/uL  Hemoglobin : 14.6 g/dL  Hematocrit : 43.8 %  Platelet Count - Automated : 246 K/uL  Mean Cell Volume : 85.0 fl  Mean Cell Hemoglobin : 28.3 pg  Mean Cell Hemoglobin Concentration : 33.3 gm/dL  Auto Neutrophil # : 5.04 K/uL  Auto Lymphocyte # : 1.21 K/uL  Auto Monocyte # : 0.59 K/uL  Auto Eosinophil # : 0.14 K/uL  Auto Basophil # : 0.04 K/uL  Auto Neutrophil % : 71.5 %  Auto Lymphocyte % : 17.2 %  Auto Monocyte % : 8.4 %  Auto Eosinophil % : 2.0 %  Auto Basophil % : 0.6 %      Ca    9.7        15 Jun 2021 06:36          CAPILLARY BLOOD GLUCOSE              RADIOLOGY & ADDITIONAL TESTS: No additional imaging    Personally reviewed.     Consultant(s) Notes Reviewed:  [x] YES  [ ] NO    Care Discussed with [x] Consultants  [x] Patient  [ ] Family  [ ]      [ ] Other; RN  DVT ppx  
Neurology follow up note    LUIS ANTONIO [id number]      Interval History:    Patient  resting in bed     MEDICATIONS    amLODIPine   Tablet 2.5 milliGRAM(s) Oral daily  busPIRone 5 milliGRAM(s) Oral <User Schedule>  hydrochlorothiazide 25 milliGRAM(s) Oral daily  levothyroxine 75 MICROGram(s) Oral daily  pravastatin 40 milliGRAM(s) Oral at bedtime      Allergies    No Known Allergies    Intolerances            Vital Signs Last 24 Hrs  T(C): 36.6 (16 Jun 2021 05:10), Max: 36.9 (15 Jhon 2021 13:04)  T(F): 97.9 (16 Jun 2021 05:10), Max: 98.5 (15 Jhon 2021 20:40)  HR: 83 (16 Jun 2021 05:10) (83 - 103)  BP: 101/60 (16 Jun 2021 05:10) (97/73 - 124/71)  BP(mean): --  RR: 18 (16 Jun 2021 05:10) (18 - 19)  SpO2: 96% (16 Jun 2021 05:10) (94% - 96%)    REVIEW OF SYSTEMS:  Constitutional:  Very limited from the patient secondary to the patient's memory issue but as per my conversation with spouse, no history of fever, chills, night sweats.  No headache.  Eyes:  No double vision or blurry vision.  Ears:  No ringing in the ears.  Neck:  No neck pain.  Cardiovascular:  No chest pain.  Respiratory:  No shortness of breath.  Abdomen:  No nausea, vomiting, or abdominal pain. Extremities/Neurological:  No numbness or tingling.  Musculoskeletal:  No joint pain.  General:  Positive history of increase in forgetfulness, difficulty ambulating, periods of agitation.    PHYSICAL EXAMINATION:  HEENT:  Head:  Normocephalic, atraumatic.  Eyes:  No scleral icterus.  Ears:  Hearing bilaterally was intact.  NECK:  Supple.  CARDIOVASCULAR:  S1 and S2 heard.  RESPIRATORY:  Good air entry bilaterally.  ABDOMEN:  Soft and nontender.  EXTREMITIES:  No clubbing or cyanosis were noted.      NEUROLOGIC:  The patient is awake.  Location unknown.  Year and month were unknown.  As per my conversation with spouse, normally will not know that.  Had difficult naming simple objects.  Extraocular movements were intact.  Speech was fluent.  Smile symmetric.  Motor:  Bilateral upper and lower were 4/5.  Sensory:  Bilateral upper and lower were intact to light touch.  Gait:  The patient had difficulty ambulating with nursing staff.                   LABS:  CBC Full  -  ( 15 Jhon 2021 06:36 )  WBC Count : 7.04 K/uL  RBC Count : 5.15 M/uL  Hemoglobin : 14.6 g/dL  Hematocrit : 43.8 %  Platelet Count - Automated : 246 K/uL  Mean Cell Volume : 85.0 fl  Mean Cell Hemoglobin : 28.3 pg  Mean Cell Hemoglobin Concentration : 33.3 gm/dL  Auto Neutrophil # : 5.04 K/uL  Auto Lymphocyte # : 1.21 K/uL  Auto Monocyte # : 0.59 K/uL  Auto Eosinophil # : 0.14 K/uL  Auto Basophil # : 0.04 K/uL  Auto Neutrophil % : 71.5 %  Auto Lymphocyte % : 17.2 %  Auto Monocyte % : 8.4 %  Auto Eosinophil % : 2.0 %  Auto Basophil % : 0.6 %      06-15    139  |  105  |  19  ----------------------------<  98  3.5   |  27  |  0.68    Ca    9.7      15 Jhon 2021 06:36  Phos  3.3     06-15  Mg     2.0     06-15    TPro  7.2  /  Alb  3.2<L>  /  TBili  0.5  /  DBili  x   /  AST  22  /  ALT  17  /  AlkPhos  90  06-15    Hemoglobin A1C:     LIVER FUNCTIONS - ( 15 Jhon 2021 06:36 )  Alb: 3.2 g/dL / Pro: 7.2 g/dL / ALK PHOS: 90 U/L / ALT: 17 U/L / AST: 22 U/L / GGT: x           Vitamin B12         RADIOLOGY    ANALYSIS AND PLAN:  This is an 89-year-old with increase in forgetfulness and altered mental status.  For episode of altered mental status, suspect most likely secondary to progressive dementia.  As per my conversation with spouse, the patient has been gradually deteriorating for the last few years.  For history of hypothyroidism, TSH noted.  Adjustment of medications as needed.  For history of hypertension, continue to monitor systolic blood pressure.  For history of hyperlipidemia, continue the patient on home statin.  spoke to staff no overnight events   possible hospice     I spoke with spouse, Donn, at [phone] 6/13/2021.  It appears that at the point that they requested possibly placement.    Greater than 40 minutes of time was spent with the patient, plan of care, reviewing data, with greater than 50% of the visit was spent counseling and/or coordinating care with multidisciplinary healthcare team    
Neurology follow up note    LUIS ANTONIO [id number]      Interval History:    Patient feels "tired"  ok no new complaints events noted     MEDICATIONS    amLODIPine   Tablet 2.5 milliGRAM(s) Oral daily  busPIRone 5 milliGRAM(s) Oral <User Schedule>  hydrochlorothiazide 25 milliGRAM(s) Oral daily  levothyroxine 75 MICROGram(s) Oral daily  pravastatin 40 milliGRAM(s) Oral at bedtime      Allergies    No Known Allergies    Intolerances            Vital Signs Last 24 Hrs  T(C): 36.7 (17 Jun 2021 05:08), Max: 36.9 (16 Jun 2021 20:40)  T(F): 98.1 (17 Jun 2021 05:08), Max: 98.5 (16 Jun 2021 20:40)  HR: 87 (17 Jun 2021 05:08) (78 - 87)  BP: 107/58 (17 Jun 2021 05:08) (107/58 - 127/74)  BP(mean): --  RR: 18 (17 Jun 2021 05:08) (17 - 19)  SpO2: 94% (17 Jun 2021 05:08) (92% - 94%)    REVIEW OF SYSTEMS:  Constitutional:  Very limited from the patient secondary to the patient's memory issue but as per my conversation with spouse, no history of fever, chills, night sweats.  No headache.  Eyes:  No double vision or blurry vision.  Ears:  No ringing in the ears.  Neck:  No neck pain.  Cardiovascular:  No chest pain.  Respiratory:  No shortness of breath.  Abdomen:  No nausea, vomiting, or abdominal pain. Extremities/Neurological:  No numbness or tingling.  Musculoskeletal:  No joint pain.  General:  Positive history of increase in forgetfulness, difficulty ambulating, periods of agitation.    PHYSICAL EXAMINATION:  HEENT:  Head:  Normocephalic, atraumatic.  Eyes:  No scleral icterus.  Ears:  Hearing bilaterally was intact.  NECK:  Supple.  CARDIOVASCULAR:  S1 and S2 heard.  RESPIRATORY:  Good air entry bilaterally.  ABDOMEN:  Soft and nontender.  EXTREMITIES:  No clubbing or cyanosis were noted.      NEUROLOGIC:  The patient is awake.  Location unknown.  Year and month were unknown.  As per my conversation with spouse, normally will not know that.  Had difficult naming simple objects.  Extraocular movements were intact.  Speech was fluent.  Smile symmetric.  Motor:  Bilateral upper and lower were 4/5.  Sensory:  Bilateral upper and lower were intact to light touch.  Gait:  The patient had difficulty ambulating with nursing staff.              LABS:            Hemoglobin A1C:       Vitamin B12         RADIOLOGY      ANALYSIS AND PLAN:  This is an 89-year-old with increase in forgetfulness and altered mental status.  For episode of altered mental status, suspect most likely secondary to progressive dementia.  As per my conversation with spouse, the patient has been gradually deteriorating for the last few years.  For history of hypothyroidism, TSH noted.  Adjustment of medications as needed.  For history of hypertension, continue to monitor systolic blood pressure.  For history of hyperlipidemia, continue the patient on home statin.  spoke to staff no overnight events   possible hospice     I spoke with spouse, Donn, at [phone] 6/13/2021.  It appears that at the point that they requested possibly placement.    Greater than 40 minutes of time was spent with the patient, plan of care, reviewing data, with greater than 50% of the visit was spent counseling and/or coordinating care with multidisciplinary healthcare team    
Patient is a 89y old  Female who presents with a chief complaint of failure to thrive (17 Jun 2021 08:05)      INTERVAL HPI/OVERNIGHT EVENTS: Patient with agitation and trying to get out of bed overnight, was given Risperdal 0.25mg x1. Patient seen and examined at bedside this morning.     MEDICATIONS  (STANDING):  amLODIPine   Tablet 2.5 milliGRAM(s) Oral daily  busPIRone 5 milliGRAM(s) Oral <User Schedule>  hydrochlorothiazide 25 milliGRAM(s) Oral daily  levothyroxine 75 MICROGram(s) Oral daily  pravastatin 40 milliGRAM(s) Oral at bedtime    MEDICATIONS  (PRN):  risperiDONE   Tablet 0.25 milliGRAM(s) Oral every 12 hours PRN Agitation      Allergies    No Known Allergies    Intolerances        REVIEW OF SYSTEMS: Comprehensive ROS limited 2/2 patient's dementia.    Vital Signs Last 24 Hrs  T(C): 36.7 (17 Jun 2021 13:09), Max: 36.9 (16 Jun 2021 20:40)  T(F): 98.1 (17 Jun 2021 13:09), Max: 98.5 (16 Jun 2021 20:40)  HR: 65 (17 Jun 2021 13:09) (65 - 87)  BP: 88/65 (17 Jun 2021 13:09) (88/65 - 127/74)  BP(mean): --  RR: 19 (17 Jun 2021 13:09) (17 - 19)  SpO2: 95% (17 Jun 2021 13:09) (93% - 95%)    PHYSICAL EXAM:  GENERAL: NAD  HEENT:  EOMI, moist mucous membranes  CHEST/LUNG:  CTA b/l, no rales, wheezes, or rhonchi  HEART:  RRR, S1, S2, no murmur  ABDOMEN:  BS+, soft, nontender, nondistended  EXTREMITIES: no edema or calf tenderness  SKIN:  warm, dry  NERVOUS SYSTEM: AA&Ox1 (oriented to first name only)    LABS:                CAPILLARY BLOOD GLUCOSE              RADIOLOGY & ADDITIONAL TESTS: No additional imaging    Personally reviewed.     Consultant(s) Notes Reviewed:  [x] YES  [ ] NO    Care Discussed with [x] Consultants  [x] Patient  [ ] Family  [ ]      [ ] Other; RN  DVT ppx  
Patient is a 89y old  Female who presents with a chief complaint of failure to thrive (15 Jhon 2021 08:38)      INTERVAL HPI/OVERNIGHT EVENTS: No acute overnight events. Patient seen and examined at bedside this morning. Denies CP, SOB, abdominal pain.    MEDICATIONS  (STANDING):  amLODIPine   Tablet 2.5 milliGRAM(s) Oral daily  busPIRone 5 milliGRAM(s) Oral <User Schedule>  hydrochlorothiazide 25 milliGRAM(s) Oral daily  levothyroxine 75 MICROGram(s) Oral daily  pravastatin 40 milliGRAM(s) Oral at bedtime    MEDICATIONS  (PRN):      Allergies    No Known Allergies    Intolerances        REVIEW OF SYSTEMS:  CONSTITUTIONAL: No fever or chills  HEENT:  No headache, no sore throat  RESPIRATORY: No cough, wheezing, or shortness of breath  CARDIOVASCULAR: No chest pain, palpitations, or leg swelling  GASTROINTESTINAL: No nausea, vomiting, or diarrhea  GENITOURINARY: No dysuria, frequency, or hematuria  NEUROLOGICAL: no focal weakness or dizziness  MUSCULOSKELETAL: no myalgias       Vital Signs Last 24 Hrs  T(C): 36.9 (15 Jhon 2021 04:34), Max: 36.9 (15 Jhon 2021 04:34)  T(F): 98.4 (15 Jhon 2021 04:34), Max: 98.4 (15 Jhon 2021 04:34)  HR: 69 (15 Jhon 2021 04:34) (69 - 85)  BP: 112/76 (15 Jhon 2021 04:34) (112/76 - 126/82)  BP(mean): --  RR: 17 (15 Jhon 2021 04:34) (17 - 19)  SpO2: 92% (15 Jhon 2021 04:34) (92% - 93%)    PHYSICAL EXAM:  GENERAL: NAD, sleeping  HEENT:  EOMI, moist mucous membranes  CHEST/LUNG:  CTA b/l, no rales, wheezes, or rhonchi  HEART:  RRR, S1, S2, no murmur  ABDOMEN:  BS+, soft, nontender, nondistended  EXTREMITIES: no edema or calf tenderness  SKIN:  warm, dry  NERVOUS SYSTEM: AA&Ox1 (states first name, unsure of last name)    LABS:                        14.6   7.04  )-----------( 246      ( 15 Jhon 2021 06:36 )             43.8     CBC Full  -  ( 15 Jhon 2021 06:36 )  WBC Count : 7.04 K/uL  Hemoglobin : 14.6 g/dL  Hematocrit : 43.8 %  Platelet Count - Automated : 246 K/uL  Mean Cell Volume : 85.0 fl  Mean Cell Hemoglobin : 28.3 pg  Mean Cell Hemoglobin Concentration : 33.3 gm/dL  Auto Neutrophil # : 5.04 K/uL  Auto Lymphocyte # : 1.21 K/uL  Auto Monocyte # : 0.59 K/uL  Auto Eosinophil # : 0.14 K/uL  Auto Basophil # : 0.04 K/uL  Auto Neutrophil % : 71.5 %  Auto Lymphocyte % : 17.2 %  Auto Monocyte % : 8.4 %  Auto Eosinophil % : 2.0 %  Auto Basophil % : 0.6 %    15 Jhon 2021 06:36    139    |  105    |  19     ----------------------------<  98     3.5     |  27     |  0.68     Ca    9.7        15 Jhon 2021 06:36  Phos  3.3       15 Jhon 2021 06:36  Mg     2.0       15 Jhon 2021 06:36    TPro  7.2    /  Alb  3.2    /  TBili  0.5    /  DBili  x      /  AST  22     /  ALT  17     /  AlkPhos  90     15 Jhon 2021 06:36        CAPILLARY BLOOD GLUCOSE              RADIOLOGY & ADDITIONAL TESTS: No additional imaging    Personally reviewed.     Consultant(s) Notes Reviewed:  [x] YES  [ ] NO    Care Discussed with [x] Consultants  [x] Patient  [ ] Family  [ ]      [ ] Other; RN  DVT ppx  
Patient is a 89y old  Female who presents with a chief complaint of failure to thrive (13 Jun 2021 10:31)      INTERVAL HPI/OVERNIGHT EVENTS: No acute overnight events. Patient seen and examined at bedside this morning. Denies CP, SOB, abdominal pain. States she does not want help or care and would like to be independent.    MEDICATIONS  (STANDING):  amLODIPine   Tablet 2.5 milliGRAM(s) Oral daily  busPIRone 5 milliGRAM(s) Oral <User Schedule>  hydrochlorothiazide 25 milliGRAM(s) Oral daily  levothyroxine 75 MICROGram(s) Oral daily  potassium chloride   Powder 40 milliEquivalent(s) Oral every 4 hours  pravastatin 40 milliGRAM(s) Oral at bedtime    MEDICATIONS  (PRN):      Allergies    No Known Allergies    Intolerances        REVIEW OF SYSTEMS:  CONSTITUTIONAL: No fever or chills  HEENT:  No headache, no sore throat  RESPIRATORY: No cough, wheezing, or shortness of breath  CARDIOVASCULAR: No chest pain, palpitations, or leg swelling  GASTROINTESTINAL: No nausea, vomiting, or diarrhea  GENITOURINARY: No dysuria, frequency, or hematuria  NEUROLOGICAL: no focal weakness or dizziness  SKIN:  No rashes or lesions   MUSCULOSKELETAL: no myalgias       Vital Signs Last 24 Hrs  T(C): 36.8 (13 Jun 2021 05:29), Max: 37 (12 Jun 2021 20:49)  T(F): 98.3 (13 Jun 2021 05:29), Max: 98.6 (12 Jun 2021 20:49)  HR: 65 (13 Jun 2021 05:29) (56 - 69)  BP: 117/58 (13 Jun 2021 05:29) (117/58 - 167/68)  BP(mean): --  RR: 17 (13 Jun 2021 05:29) (17 - 18)  SpO2: 94% (13 Jun 2021 05:29) (94% - 97%)    PHYSICAL EXAM:  GENERAL: NAD, eating breakfast  HEENT:  EOMI, moist mucous membranes  CHEST/LUNG:  CTA b/l, no rales, wheezes, or rhonchi  HEART:  RRR, S1, S2, no murmur  ABDOMEN:  BS+, soft, nontender, nondistended  EXTREMITIES: no edema or calf tenderness  SKIN:  warm, dry  NERVOUS SYSTEM: AA&Ox1 (unsure of place or date, including year)    LABS:                        13.8   7.76  )-----------( 220      ( 13 Jun 2021 06:05 )             41.1     CBC Full  -  ( 13 Jun 2021 06:05 )  WBC Count : 7.76 K/uL  Hemoglobin : 13.8 g/dL  Hematocrit : 41.1 %  Platelet Count - Automated : 220 K/uL  Mean Cell Volume : 85.8 fl  Mean Cell Hemoglobin : 28.8 pg  Mean Cell Hemoglobin Concentration : 33.6 gm/dL  Auto Neutrophil # : x  Auto Lymphocyte # : x  Auto Monocyte # : x  Auto Eosinophil # : x  Auto Basophil # : x  Auto Neutrophil % : x  Auto Lymphocyte % : x  Auto Monocyte % : x  Auto Eosinophil % : x  Auto Basophil % : x    13 Jun 2021 06:05    142    |  106    |  15     ----------------------------<  84     3.1     |  27     |  0.54     Ca    9.1        13 Jun 2021 06:05  Mg     2.0       12 Jun 2021 11:29    TPro  7.0    /  Alb  3.3    /  TBili  0.4    /  DBili  x      /  AST  22     /  ALT  14     /  AlkPhos  86     12 Jun 2021 11:29        CAPILLARY BLOOD GLUCOSE              RADIOLOGY & ADDITIONAL TESTS: No additional imaging        Consultant(s) Notes Reviewed:  [x] YES  [ ] NO    Care Discussed with [x] Consultants  [x] Patient  [ ] Family  [ ]      [ ] Other; RN  DVT ppx

## 2021-06-18 NOTE — DISCHARGE NOTE NURSING/CASE MANAGEMENT/SOCIAL WORK - PATIENT PORTAL LINK FT
You can access the FollowMyHealth Patient Portal offered by [facility] by registering at the following website: [email or web address]. By joining [employer]s FollowMyHealth portal, you will also be able to view your health information using other applications (apps) compatible with our system.

## 2023-08-19 NOTE — ED PROVIDER NOTE - NS ED ROS FT
GEN: no fever, no chills, no weakness  HENT: no eye pain, no visual changes, no ear pain, no visual or hearing changes, no sore throat, no swelling or neck pain  CV: no chest pain, no palpitations, no dizziness, no swelling  RESP: no coughing, no sob, no IWOB, no ESQUIVEL  GI: no abd pain, no distension, no nausea, no vomiting, no diarrhea, no constipation  : no dysuria,  no frequency, no hematuria, no discharge, no flank pain  MUSCULOSKELETAL: no myalgia, no arthralgia, no joint swelling, no bruising   SKIN: no rash, no wounds, no itching  NEURO: no change in mentation, no visual changes, no HA, no focal weakness, no trouble speaking, no gait abnormalities, no dizziness  PSYCH: no suidical ideation, no homicidal ideation, no depression, no anxiety, no hallucinations 4 = No assist / stand by assistance

## 2024-11-13 NOTE — DISCHARGE NOTE PROVIDER - NSDCADMDATE_GEN_ALL_CORE_FT
Body mass index is 34.02 kg/m².  The BMI is above normal. Nutrition recommendations include reducing portion sizes, decreasing overall calorie intake, 3-5 servings of fruits/vegetables daily, reducing fast food intake, consuming healthier snacks, decreasing soda and/or juice intake, moderation in carbohydrate intake, increasing intake of lean protein, reducing intake of saturated fat and trans fat and reducing intake of cholesterol. Exercise recommendations include moderate aerobic physical activity for 150 minutes/week.      Orders:    Hemoglobin A1C; Future     12-Jun-2021 14:45